# Patient Record
Sex: FEMALE | Race: ASIAN | NOT HISPANIC OR LATINO | URBAN - METROPOLITAN AREA
[De-identification: names, ages, dates, MRNs, and addresses within clinical notes are randomized per-mention and may not be internally consistent; named-entity substitution may affect disease eponyms.]

---

## 2017-03-28 ENCOUNTER — OFFICE VISIT (OUTPATIENT)
Dept: FAMILY MEDICINE | Facility: CLINIC | Age: 22
End: 2017-03-28
Payer: COMMERCIAL

## 2017-03-28 VITALS
OXYGEN SATURATION: 99 % | BODY MASS INDEX: 25.86 KG/M2 | HEIGHT: 61 IN | WEIGHT: 137 LBS | SYSTOLIC BLOOD PRESSURE: 106 MMHG | TEMPERATURE: 98.4 F | HEART RATE: 80 BPM | DIASTOLIC BLOOD PRESSURE: 72 MMHG

## 2017-03-28 DIAGNOSIS — Z11.1 SCREENING EXAMINATION FOR PULMONARY TUBERCULOSIS: Primary | ICD-10-CM

## 2017-03-28 PROCEDURE — 99213 OFFICE O/P EST LOW 20 MIN: CPT | Performed by: PHYSICIAN ASSISTANT

## 2017-03-28 PROCEDURE — 86480 TB TEST CELL IMMUN MEASURE: CPT | Performed by: PHYSICIAN ASSISTANT

## 2017-03-28 PROCEDURE — 36415 COLL VENOUS BLD VENIPUNCTURE: CPT | Performed by: PHYSICIAN ASSISTANT

## 2017-03-28 PROCEDURE — T1013 SIGN LANG/ORAL INTERPRETER: HCPCS | Mod: U3 | Performed by: PHYSICIAN ASSISTANT

## 2017-03-28 NOTE — PATIENT INSTRUCTIONS
Reassuring you have no symptoms of active TB.  Please verify diagnosis of other family members.  Will screen for TB.  If positive you will need to return for a CXR for further evaluation.    Electronically Signed By: Janki Robbins PA-C

## 2017-03-28 NOTE — MR AVS SNAPSHOT
"              After Visit Summary   3/28/2017    Bertha Klein    MRN: [id number]           Patient Information     Date Of Birth          1995        Visit Information        Provider Department      3/28/2017 10:00 AM Tonny Barnhart; Janki Robbins PA-C [facility]        Today's Diagnoses     Screening examination for pulmonary tuberculosis    -  1      Care Instructions    Reassuring you have no symptoms of active TB.  Please verify diagnosis of other family members.  Will screen for TB.  If positive you will need to return for a CXR for further evaluation.    Electronically Signed By: Janki Robbins PA-C          Follow-ups after your visit        Who to contact     If you have questions or need follow up information about today's clinic visit or your schedule please contact [facility] directly at [phone].  Normal or non-critical lab and imaging results will be communicated to you by MyChart, letter or phone within 4 business days after the clinic has received the results. If you do not hear from us within 7 days, please contact the clinic through MyChart or phone. If you have a critical or abnormal lab result, we will notify you by phone as soon as possible.  Submit refill requests through [employer] or call your pharmacy and they will forward the refill request to us. Please allow 3 business days for your refill to be completed.          Additional Information About Your Visit        MyChart Information     [employer] lets you send messages to your doctor, view your test results, renew your prescriptions, schedule appointments and more. To sign up, go to [email or web address] . Click on \"Log in\" on the left side of the screen, which will take you to the Welcome page. Then click on \"Sign up Now\" on the right side of the page.     You will be asked to enter the access code listed below, as well as some personal information. Please follow the directions to create " "your username and password.     Your access code is: [email or web address]  Expires: 2017 10:45 AM     Your access code will  in 90 days. If you need help or a new code, please call your [place] clinic or [phone].        Care EveryWhere ID     This is your Care EveryWhere ID. This could be used by other organizations to access your [place] medical records  [id number]        Your Vitals Were     Pulse Temperature Height Pulse Oximetry Breastfeeding? BMI (Body Mass Index)    80 98.4  F (36.9  C) (Oral) 5' 0.5\" (1.537 m) 99% No 26.32 kg/m2       Blood Pressure from Last 3 Encounters:   17 106/72   16 102/62   08/11/15 102/74    Weight from Last 3 Encounters:   17 137 lb (62.1 kg)   16 136 lb (61.7 kg)   16 134 lb (60.8 kg)              We Performed the Following     M Tuberculosis by Quantiferon          Today's Medication Changes          These changes are accurate as of: 3/28/17 10:45 AM.  If you have any questions, ask your nurse or doctor.               Stop taking these medicines if you haven't already. Please contact your care team if you have questions.     fluconazole 150 MG tablet   Commonly known as:  DIFLUCAN   Stopped by:  Janki Robbins PA-C                    Primary Care Provider    None Specified       No primary provider on file.        Thank you!     Thank you for choosing [facility]  for your care. Our goal is always to provide you with excellent care. Hearing back from our patients is one way we can continue to improve our services. Please take a few minutes to complete the written survey that you may receive in the mail after your visit with us. Thank you!             Your Updated Medication List - Protect others around you: Learn how to safely use, store and throw away your medicines at www.disposemymeds.org.      Notice  As of 3/28/2017 10:45 AM    You have not been prescribed any medications.      "

## 2017-03-28 NOTE — PROGRESS NOTES
SUBJECTIVE:                                                    Bertha Klein is a 21 year old female who presents to clinic today for the following health issues:    Here today with  to discuss concerns about potential exposure to TB.  Pt reports that her cousin and one of her uncles was diagnosed with TB. Upon further discussion she isn't sure if this was active or LTBI.  She doesn't live with them, but has dinner with them as a family at times.  She does not re-call that they were actively sick when she was around them. The last she was around them was 1 month ago. Believes they were diagnosed in Nov of 2016.   Her cousin had a baby and sounds like she told the patient that she wasn't contagious and this is what prompted pt to wonder if she should be checked for TB. Cousin from [facility] as is pt.   Pt denies receiving any BCG vaccine.  Pt denies any prior screening for TB.  Denies any sx of active TB including any persistent cough, fevers, night sweats, hemoptysis, abdominal pain or arthralgia.   She takes no immunosuppressive medications.  She otherwise enjoys good health.    Problem list and histories reviewed & adjusted, as indicated.  Additional history: as documented    There is no problem list on file for this patient.    Past Surgical History:   Procedure Laterality Date     APPENDECTOMY  07/2009       Social History   Substance Use Topics     Smoking status: Never Smoker     Smokeless tobacco: Never Used     Alcohol use No     History reviewed. No pertinent family history.      No current outpatient prescriptions on file.     No Known Allergies    Reviewed and updated as needed this visit by clinical staff  Tobacco  Allergies  Meds       Reviewed and updated as needed this visit by Provider         ROS:  Constitutional, HEENT, cardiovascular, pulmonary, gi and gu systems are negative, except as otherwise noted.    OBJECTIVE:                                                    /72 (BP  "Location: Right arm, Cuff Size: Adult Regular)  Pulse 80  Temp 98.4  F (36.9  C) (Oral)  Ht 5' 0.5\" (1.537 m)  Wt 137 lb (62.1 kg)  SpO2 99%  Breastfeeding? No  BMI 26.32 kg/m2  Body mass index is 26.32 kg/(m^2).  GENERAL: healthy, alert and no distress  EYES: Eyes grossly normal to inspection, PERRL and conjunctivae and sclerae normal  HENT: ear canals and TM's normal, nose and mouth without ulcers or lesions  NECK: no adenopathy, no asymmetry, masses, or scars and thyroid normal to palpation  RESP: lungs clear to auscultation - no rales, rhonchi or wheezes  CV: regular rate and rhythm, normal S1 S2, no S3 or S4, no murmur, click or rub, no peripheral edema and peripheral pulses strong    Diagnostic Test Results:  pending     ASSESSMENT/PLAN:                                                        ICD-10-CM    1. Screening examination for pulmonary tuberculosis Z11.1 M Tuberculosis by Quantiferon   Pt reports potential exposure to family members that she reports were diagnosed with TB, but she is not sure if this was LTBI versus active.  She never recalls seeing them with any active sx of TB and also reports that her cousin told her that she was not contagious which I let her know sounds more c/w LTBI.  Certainly given pt was born in [facility] and has never been screened would be a good idea to obtain this.  She declines TST and would like to obtain TB-gold testing today.  Is aware she will need to RTC for additional visit if tests +.  Pt in agreement with plan.  See Patient Instructions  She is also overdue for CPE and she was encouraged to schedule f/u for pap and preventative health.  Patient Instructions   Reassuring you have no symptoms of active TB.  Please verify diagnosis of other family members.  Will screen for TB.  If positive you will need to return for a CXR for further evaluation.    Electronically Signed By: Janki Robbins PA-C        "

## 2017-03-30 LAB
M TB TUBERC IFN-G BLD QL: NEGATIVE
M TB TUBERC IFN-G/MITOGEN IGNF BLD: 0.04 IU/ML

## 2017-03-31 ENCOUNTER — OFFICE VISIT (OUTPATIENT)
Dept: FAMILY MEDICINE | Facility: CLINIC | Age: 22
End: 2017-03-31
Payer: COMMERCIAL

## 2017-03-31 VITALS
TEMPERATURE: 98.3 F | WEIGHT: 139.3 LBS | BODY MASS INDEX: 26.3 KG/M2 | HEIGHT: 61 IN | SYSTOLIC BLOOD PRESSURE: 90 MMHG | OXYGEN SATURATION: 96 % | DIASTOLIC BLOOD PRESSURE: 66 MMHG | HEART RATE: 78 BPM

## 2017-03-31 DIAGNOSIS — E66.3 OVERWEIGHT (BMI 25.0-29.9): ICD-10-CM

## 2017-03-31 DIAGNOSIS — N89.8 VAGINAL ITCHING: ICD-10-CM

## 2017-03-31 DIAGNOSIS — Z13.6 CARDIOVASCULAR SCREENING; LDL GOAL LESS THAN 160: ICD-10-CM

## 2017-03-31 DIAGNOSIS — Z12.4 SCREENING FOR MALIGNANT NEOPLASM OF CERVIX: ICD-10-CM

## 2017-03-31 DIAGNOSIS — Z00.01 ENCOUNTER FOR ROUTINE ADULT HEALTH EXAMINATION WITH ABNORMAL FINDINGS: Primary | ICD-10-CM

## 2017-03-31 DIAGNOSIS — N91.1 SECONDARY AMENORRHEA: ICD-10-CM

## 2017-03-31 DIAGNOSIS — Z23 NEED FOR PROPHYLACTIC VACCINATION WITH TETANUS-DIPHTHERIA (TD): ICD-10-CM

## 2017-03-31 DIAGNOSIS — Z23 NEED FOR HPV VACCINE: ICD-10-CM

## 2017-03-31 LAB
BETA HCG QUAL IFA URINE: NEGATIVE
FSH SERPL-ACNC: 4.3 IU/L
HBA1C MFR BLD: 5.5 % (ref 4.3–6)
MICRO REPORT STATUS: NORMAL
PROLACTIN SERPL-MCNC: 9 UG/L (ref 3–27)
SPECIMEN SOURCE: NORMAL
WET PREP SPEC: NORMAL

## 2017-03-31 PROCEDURE — 84270 ASSAY OF SEX HORMONE GLOBUL: CPT | Performed by: PHYSICIAN ASSISTANT

## 2017-03-31 PROCEDURE — 84703 CHORIONIC GONADOTROPIN ASSAY: CPT | Performed by: PHYSICIAN ASSISTANT

## 2017-03-31 PROCEDURE — 99395 PREV VISIT EST AGE 18-39: CPT | Mod: 25 | Performed by: PHYSICIAN ASSISTANT

## 2017-03-31 PROCEDURE — 84146 ASSAY OF PROLACTIN: CPT | Performed by: PHYSICIAN ASSISTANT

## 2017-03-31 PROCEDURE — 90471 IMMUNIZATION ADMIN: CPT | Performed by: PHYSICIAN ASSISTANT

## 2017-03-31 PROCEDURE — T1013 SIGN LANG/ORAL INTERPRETER: HCPCS | Mod: U3 | Performed by: PHYSICIAN ASSISTANT

## 2017-03-31 PROCEDURE — 84443 ASSAY THYROID STIM HORMONE: CPT | Performed by: PHYSICIAN ASSISTANT

## 2017-03-31 PROCEDURE — 83001 ASSAY OF GONADOTROPIN (FSH): CPT | Performed by: PHYSICIAN ASSISTANT

## 2017-03-31 PROCEDURE — 99213 OFFICE O/P EST LOW 20 MIN: CPT | Mod: 25 | Performed by: PHYSICIAN ASSISTANT

## 2017-03-31 PROCEDURE — 83036 HEMOGLOBIN GLYCOSYLATED A1C: CPT | Performed by: PHYSICIAN ASSISTANT

## 2017-03-31 PROCEDURE — 36415 COLL VENOUS BLD VENIPUNCTURE: CPT | Performed by: PHYSICIAN ASSISTANT

## 2017-03-31 PROCEDURE — 90649 4VHPV VACCINE 3 DOSE IM: CPT | Performed by: PHYSICIAN ASSISTANT

## 2017-03-31 PROCEDURE — G0145 SCR C/V CYTO,THINLAYER,RESCR: HCPCS | Performed by: PHYSICIAN ASSISTANT

## 2017-03-31 PROCEDURE — 84403 ASSAY OF TOTAL TESTOSTERONE: CPT | Performed by: PHYSICIAN ASSISTANT

## 2017-03-31 PROCEDURE — 87210 SMEAR WET MOUNT SALINE/INK: CPT | Performed by: PHYSICIAN ASSISTANT

## 2017-03-31 NOTE — PATIENT INSTRUCTIONS
Will re-check labs for possible causes of no periods.  Given duration of issue though did also encourage pelvic US and consult with OB/GYn.  Referral placed today.  Will notify of labs when available.  Electronically Signed By: Janki Robbins PA-C      Preventive Health Recommendations  Female Ages 18 to 25     Yearly exam:     See your health care provider every year in order to  o Review health changes.   o Discuss preventive care.    o Review your medicines if your doctor has prescribed any.      You should be tested each year for STDs (sexually transmitted diseases).       After age 20, talk to your provider about how often you should have cholesterol testing.      Starting at age 21, get a Pap test every three years. If you have an abnormal result, your doctor may have you test more often.      If you are at risk for diabetes, you should have a diabetes test (fasting glucose).     Shots:     Get a flu shot each year.     Get a tetanus shot every 10 years.     Consider getting the shot (vaccine) that prevents cervical cancer (Gardasil).    Nutrition:     Eat at least 5 servings of fruits and vegetables each day.    Eat whole-grain bread, whole-wheat pasta and brown rice instead of white grains and rice.    Talk to your provider about Calcium and Vitamin D.     Lifestyle    Exercise at least 150 minutes a week each week (30 minutes a day, 5 days a week). This will help you control your weight and prevent disease.    Limit alcohol to one drink per day.    No smoking.     Wear sunscreen to prevent skin cancer.    See your dentist every six months for an exam and cleaning.

## 2017-03-31 NOTE — NURSING NOTE
"Chief Complaint   Patient presents with     Physical       Initial BP 90/66  Pulse 78  Temp 98.3  F (36.8  C) (Oral)  Ht 5' 0.5\" (1.537 m)  Wt 139 lb 4.8 oz (63.2 kg)  LMP 10/11/2016 (Approximate)  SpO2 96%  BMI 26.76 kg/m2 Estimated body mass index is 26.76 kg/(m^2) as calculated from the following:    Height as of this encounter: 5' 0.5\" (1.537 m).    Weight as of this encounter: 139 lb 4.8 oz (63.2 kg).  Medication Reconciliation: complete   Citlalli Culver Medical Assistant      "

## 2017-03-31 NOTE — LETTER
April 5, 2017      Bertha Klein  [address] [place] [id number]    Dear ,    I am happy to inform you that your recent cervical cancer screening test (PAP smear) was normal.      Preventative screenings such as this help to ensure your health for years to come. You should repeat a pap smear in 3 years, unless otherwise directed.      You will still need to return to the clinic every year for your annual exam and other preventive tests.     Please contact the clinic at [phone] if you have further questions.       Sincerely,      Janki Robbins PA-C/felicitas

## 2017-03-31 NOTE — PROGRESS NOTES
SUBJECTIVE:     CC: Bertha lKein is an 21 year old woman who presents for preventive health visit.     Healthy Habits:    Do you get at least three servings of calcium containing foods daily (dairy, green leafy vegetables, etc.)? No     Amount of exercise or daily activities, outside of work: 2-3 day(s) per week 30 minutes     Problems taking medications regularly No    Medication side effects: No    Have you had an eye exam in the past two years? yes    Do you see a dentist twice per year? yes    Do you have sleep apnea, excessive snoring or daytime drowsiness?yes snoring     Here with [place] .    No period for 6 months - LMP Oct 2016. Was worried that OCPs were causing weight gain so she discontinued it. While on OCPs does report that her periods were regular for 3 months. Then she continued to have periods until October, but since then she hasn't had any period.   Menarche age 14-15 - had regular periods once per month until age 19. Periods usually lasted for 7 days.  Reports prior to taking OCPs she had no periods.  EPIC review shows previous nl TSH, prolactin, FSH and testosterone in 2015.  Denies ever having any sexual activity.    Vaginal concern - burning. Itching x1 month. Endorses vaginal discharge, but feels this is her typical discharge and hasn't changed.    One sore area in vaginal area that bled sometimes - this occurred about 1 month. Has since resolved. Does admit that she shaves this region and believes she did scratch it.   Again denies any sexual activity.    Today's PHQ-2 Score:   PHQ-2 ( 1999 Pfizer) 3/31/2017 3/28/2017   Q1: Little interest or pleasure in doing things 0 0   Q2: Feeling down, depressed or hopeless 0 0   PHQ-2 Score 0 0       Abuse: Current or Past(Physical, Sexual or Emotional)- No  Do you feel safe in your environment - Yes    Social History   Substance Use Topics     Smoking status: Never Smoker     Smokeless tobacco: Never Used     Alcohol use No     No  "alcohol use     Recent Labs   Lab Test  02/26/16   1459   CHOL  154   HDL  38*   LDL  85   TRIG  155*   NHDL  116       Reviewed orders with patient.  Reviewed health maintenance and updated orders accordingly - Yes    Mammo Decision Support:  Mammogram not appropriate for this patient based on age.    Pertinent mammograms are reviewed under the imaging tab.  History of abnormal Pap smear: NO - age 21-29 PAP every 3 years recommended    Reviewed and updated as needed this visit by clinical staff  Tobacco  Allergies  Meds  Med Hx  Surg Hx  Fam Hx  Soc Hx        Reviewed and updated as needed this visit by Provider            ROS:  C: NEGATIVE for fever, chills, change in weight  I: NEGATIVE for worrisome rashes, moles or lesions  E: NEGATIVE for vision changes or irritation  ENT: NEGATIVE for ear, mouth and throat problems  R: NEGATIVE for significant cough or SOB  B: NEGATIVE for masses, tenderness or discharge  CV: NEGATIVE for chest pain, palpitations or peripheral edema  GI: NEGATIVE for nausea, abdominal pain, heartburn, or change in bowel habits   female: + for vaginal itching and no periods as noted above in HPI.  M: NEGATIVE for significant arthralgias or myalgia  N: NEGATIVE for weakness, dizziness or paresthesias  P: NEGATIVE for changes in mood or affect    Problem list, Medication list, Allergies, and Medical/Social/Surgical histories reviewed in EPIC and updated as appropriate.  OBJECTIVE:     BP 90/66  Pulse 78  Temp 98.3  F (36.8  C) (Oral)  Ht 5' 0.5\" (1.537 m)  Wt 139 lb 4.8 oz (63.2 kg)  LMP 10/11/2016 (Approximate)  SpO2 96%  BMI 26.76 kg/m2  EXAM:  GENERAL: healthy, alert and no distress  EYES: Eyes grossly normal to inspection, PERRL and conjunctivae and sclerae normal  HENT: ear canals and TM's normal, nose and mouth without ulcers or lesions  NECK: no adenopathy, no asymmetry, masses, or scars and thyroid normal to palpation  RESP: lungs clear to auscultation - no rales, rhonchi " or wheezes  BREAST: normal without masses, tenderness or nipple discharge and no palpable axillary masses or adenopathy  CV: regular rate and rhythm, normal S1 S2, no S3 or S4, no murmur, click or rub, no peripheral edema and peripheral pulses strong  ABDOMEN: soft, nontender, no hepatosplenomegaly, no masses and bowel sounds normal   (female): normal female external genitalia, normal urethral meatus, vaginal mucosa pink, moist, well rugated, and normal cervix/adnexa/uterus without masses. Mild clear to white discharge.   MS: no gross musculoskeletal defects noted, no edema  SKIN: no suspicious lesions or rashes  NEURO: Normal strength and tone, mentation intact and speech normal  PSYCH: mentation appears normal, affect normal/bright    Results for orders placed or performed in visit on 03/31/17   Beta HCG qual IFA urine   Result Value Ref Range    Beta HCG Qual IFA Urine Negative NEG   Wet prep   Result Value Ref Range    Specimen Description Vagina     Wet Prep       No Trichomonas seen  No clue cells seen  No yeast seen      Micro Report Status FINAL 03/31/2017          ASSESSMENT/PLAN:         ICD-10-CM    1. Encounter for routine adult health examination with abnormal findings Z00.01 Hemoglobin A1c   2. Screening for malignant neoplasm of cervix Z12.4    3. Need for HPV vaccine Z23 HUMAN PAPILLOMAVIRUS VACCINE   4. Need for prophylactic vaccination with tetanus-diphtheria (TD) Z23    5. 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 Vaginal itching L29.8 Wet prep   7. CARDIOVASCULAR SCREENING; LDL GOAL LESS THAN 160 Z13.6    8. Overweight (BMI 25.0-29.9) E66.3    Reports nl periods until age 21 then has had intermittent irregular  "period with most recently no period for 6 months. Will work-up for secondary amenorrhea and then advised consult with OB/GYN.  Pt in agreement with plan.  Tetanus hx added by pt report as stated this was received in [facility], she will see if can't bring in copy of these records for us.  Will notify of results when available.  Unclear if vaginal itching was due to shaving externally, but provided reassurance there was no lesion or sore visible or any vaginal injury I can see today. Considered GC screening, but pt adamant she has never had any sexual activity, thus, this was not completed today. Can f/u with recurrence or concerns.    COUNSELING:   Reviewed preventive health counseling, as reflected in patient instructions       Regular exercise       Healthy diet/nutrition       Vaccinated for: Human Papillomavirus       reports that she has never smoked. She has never used smokeless tobacco.    Estimated body mass index is 26.76 kg/(m^2) as calculated from the following:    Height as of this encounter: 5' 0.5\" (1.537 m).    Weight as of this encounter: 139 lb 4.8 oz (63.2 kg).   Weight management plan: encouraged regular exercise and healthy habits.    Counseling Resources:  ATP IV Guidelines  Pooled Cohorts Equation Calculator  Breast Cancer Risk Calculator  FRAX Risk Assessment  ICSI Preventive Guidelines  Dietary Guidelines for Americans, 2010  USDA's MyPlate  ASA Prophylaxis  Lung CA Screening    Janki Robbins PA-C  [facility]  "

## 2017-03-31 NOTE — MR AVS SNAPSHOT
After Visit Summary   3/31/2017    Bertha Klein    MRN: [id number]           Patient Information     Date Of Birth          1995        Visit Information        Provider Department      3/31/2017 10:00 AM Butch Zuniga; Janki Robbins PA-C [facility]        Today's Diagnoses     Encounter for routine adult health examination with abnormal findings    -  1    Screening for malignant neoplasm of cervix        Need for HPV vaccine        Need for prophylactic vaccination with tetanus-diphtheria (TD)        Secondary amenorrhea        Vaginal itching          Care Instructions    Will re-check labs for possible causes of no periods.  Given duration of issue though did also encourage pelvic US and consult with OB/GYn.  Referral placed today.  Will notify of labs when available.  Electronically Signed By: Janki Robbins PA-C      Preventive Health Recommendations  Female Ages 18 to 25     Yearly exam:     See your health care provider every year in order to  o Review health changes.   o Discuss preventive care.    o Review your medicines if your doctor has prescribed any.      You should be tested each year for STDs (sexually transmitted diseases).       After age 20, talk to your provider about how often you should have cholesterol testing.      Starting at age 21, get a Pap test every three years. If you have an abnormal result, your doctor may have you test more often.      If you are at risk for diabetes, you should have a diabetes test (fasting glucose).     Shots:     Get a flu shot each year.     Get a tetanus shot every 10 years.     Consider getting the shot (vaccine) that prevents cervical cancer (Gardasil).    Nutrition:     Eat at least 5 servings of fruits and vegetables each day.    Eat whole-grain bread, whole-wheat pasta and brown rice instead of white grains and rice.    Talk to your provider about Calcium and Vitamin D.     Lifestyle    Exercise at least  150 minutes a week each week (30 minutes a day, 5 days a week). This will help you control your weight and prevent disease.    Limit alcohol to one drink per day.    No smoking.     Wear sunscreen to prevent skin cancer.    See your dentist every six months for an exam and cleaning.        Follow-ups after your visit        Additional Services     OB/GYN REFERRAL       Your provider has referred you to:  FMG: [facility] [place] [phone]   [email or web address]    FHN: [facility] [place] [phone]   [email or web address]  FHN: [facility] [place] [phone]   [email or web address]    Please be aware that coverage of these services is subject to the terms and limitations of your health insurance plan.  Call member services at your health plan with any benefit or coverage questions.      Please bring the following with you to your appointment:    (1) Any X-Rays, CTs or MRIs which have been performed.  Contact the facility where they were done to arrange for  prior to your scheduled appointment.   (2) List of current medications   (3) This referral request   (4) Any documents/labs given to you for this referral                  Future tests that were ordered for you today     Open Future Orders        Priority Expected Expires Ordered    US Pelvic Complete w Transvaginal Routine 6/29/2017 3/31/2018 3/31/2017            Who to contact     If you have questions or need follow up information about today's clinic visit or your schedule please contact [facility] directly at [phone].  Normal or non-critical lab and imaging results will be communicated to you by MyChart, letter or phone within 4 business days after the clinic has received the results. If you do not hear from us within 7 days, please contact the clinic through MyChart or phone. If you have a critical or  "abnormal lab result, we will notify you by phone as soon as possible.  Submit refill requests through [email or web address] or call your pharmacy and they will forward the refill request to us. Please allow 3 business days for your refill to be completed.          Additional Information About Your Visit        Wevebobhart Information     [email or web address] lets you send messages to your doctor, view your test results, renew your prescriptions, schedule appointments and more. To sign up, go to [email or web address] . Click on \"Log in\" on the left side of the screen, which will take you to the Welcome page. Then click on \"Sign up Now\" on the right side of the page.     You will be asked to enter the access code listed below, as well as some personal information. Please follow the directions to create your username and password.     Your access code is: [email or web address]  Expires: 2017 10:45 AM     Your access code will  in 90 days. If you need help or a new code, please call your [facility] clinic or [phone].        Care EveryWhere ID     This is your Care EveryWhere ID. This could be used by other organizations to access your [facility] medical records  [id number]        Your Vitals Were     Pulse Temperature Height Last Period Pulse Oximetry BMI (Body Mass Index)    78 98.3  F (36.8  C) (Oral) 5' 0.5\" (1.537 m) 10/11/2016 (Approximate) 96% 26.76 kg/m2       Blood Pressure from Last 3 Encounters:   17 90/66   17 106/72   16 102/62    Weight from Last 3 Encounters:   17 139 lb 4.8 oz (63.2 kg)   17 137 lb (62.1 kg)   16 136 lb (61.7 kg)              We Performed the Following     **Testosterone Free and Total FUTURE anytime     Beta HCG qual IFA urine     Follicle stimulating hormone     Hemoglobin A1c     HUMAN PAPILLOMAVIRUS VACCINE     OB/GYN REFERRAL     Pap imaged thin layer screen only - recommended age 21 - 24 years     Prolactin     TSH with free T4 reflex     Wet prep        Primary Care " Provider Office Phone # Fax #    Janki Robbins PA-C [phone]       [facility] [address] [facility] [address]        Thank you!     Thank you for choosing [facility]  for your care. Our goal is always to provide you with excellent care. Hearing back from our patients is one way we can continue to improve our services. Please take a few minutes to complete the written survey that you may receive in the mail after your visit with us. Thank you!             Your Updated Medication List - Protect others around you: Learn how to safely use, store and throw away your medicines at www.disposemymeds.org.      Notice  As of 3/31/2017 10:58 AM    You have not been prescribed any medications.

## 2017-03-31 NOTE — LETTER
[facility]     [address]  [place], [place] [address] [phone]   April 13, 2017    Celso Klein  [address] [facility] [address]      Dear Celso,    Here is a summary of your recent test results:    TSH (thyroid stimulating hormone) level is normal which indicates normal thyroid function.   -A1C (diabetic test) is normal and indicates that your blood sugar has been in a normal range the last 3 months.   -Prolactin and FSH (follicle stimulating hormone) were normal.   -Testosterone total was normal, but free level was slightly elevated. This should be repeated at 8am in the morning to confirm result.   Normal TB screening (mantoux test).    Your test results are enclosed.      Thank you for choosing [facility].  We appreciate the opportunity to serve you and look forward to supporting your healthcare needs in the future.    If you have any questions or concerns, please call me or my staff at [phone].    Best regards,  Talya Robbins PA-C        Results for orders placed or performed in visit on 03/31/17   Pap imaged thin layer screen only - recommended age 21 - 24 years   Result Value Ref Range    PAP NIL     Copath Report         Patient Name: CELSO KLEIN  MR#: [id number]  Specimen #: [id number]  Collected: 3/31/2017  Received: 4/3/2017  Reported: 4/4/2017 15:22  Ordering Phy(s): TALYA ROBBINS    For improved result formatting, select 'View Enhanced Report Format'  under Linked Documents section.    SPECIMEN/STAIN PROCESS:  Pap imaged thin layer prep screening (Surepath, FocalPoint with guided  screening)       Pap-Cyto x 1    SOURCE: Cervical, endocervical  ----------------------------------------------------------------   Pap imaged thin layer prep screening (Surepath, FocalPoint with guided  screening)  SPECIMEN ADEQUACY:  Satisfactory for evaluation.  -Transformation zone component  present.    CYTOLOGIC INTERPRETATION:    Negative for Intraepithelial Lesion or Malignancy    Electronically signed out by:  CISCO Og (ASCP)    Processed and screened at [facility],  [facility]    CLINICAL HISTORY:  LMP: 10/11/2016  Amenorrhea: For the past 6 m onths,    Papanicolaou Test Limitations:  Cervical cytology is a screening test  with limited sensitivity; regular screening is critical for cancer  prevention; Pap tests are primarily effective for the  diagnosis/prevention of squamous cell carcinoma, not adenocarcinomas or  other cancers.    TESTING LAB LOCATION:  [facility] [address] [place]  [address] [phone]    COLLECTION SITE:  Client:  [facility]  Location: [facility] (R)     Beta HCG qual IFA urine   Result Value Ref Range    Beta HCG Qual IFA Urine Negative NEG   TSH with free T4 reflex   Result Value Ref Range    TSH 0.94 0.40 - 4.00 mU/L   Prolactin   Result Value Ref Range    Prolactin 9 3 - 27 ug/L   Follicle stimulating hormone   Result Value Ref Range    FSH 4.3 IU/L   **Testosterone Free and Total FUTURE anytime   Result Value Ref Range    Testosterone Total 28 8 - 60 ng/dL    Sex Hormone Binding Globulin 7 (L) 30 - 135 nmol/L    Free Testosterone Calculated 0.92 (H) 0.08 - 0.74 ng/dL   Hemoglobin A1c   Result Value Ref Range    Hemoglobin A1C 5.5 4.3 - 6.0 %   Wet prep   Result Value Ref Range    Specimen Description Vagina     Wet Prep       No Trichomonas seen  No clue cells seen  No yeast seen      Micro Report Status FINAL 03/31/2017

## 2017-04-01 LAB — TSH SERPL DL<=0.005 MIU/L-ACNC: 0.94 MU/L (ref 0.4–4)

## 2017-04-04 LAB
COPATH REPORT: NORMAL
PAP: NORMAL
SHBG SERPL-SCNC: 7 NMOL/L (ref 30–135)
TESTOST FREE SERPL-MCNC: 0.92 NG/DL (ref 0.08–0.74)
TESTOST SERPL-MCNC: 28 NG/DL (ref 8–60)

## 2017-04-12 NOTE — PROGRESS NOTES
Please call or write patient with the following results:     -TSH (thyroid stimulating hormone) level is normal which indicates normal thyroid function.   -A1C (diabetic test) is normal and indicates that your blood sugar has been in a normal range the last 3 months.   -Prolactin and FSH (follicle stimulating hormone) were normal.   -Testosterone total was normal, but free level was slightly elevated. This should be repeated at 8am in the morning to confirm result.       Electronically Signed By: Janki Robbins PA-C

## 2017-04-12 NOTE — PROGRESS NOTES
Please also notify the pt that her TB Screening was normal/neg.  Electronically Signed By: Janki Robbins PA-C

## 2017-06-12 ENCOUNTER — ALLIED HEALTH/NURSE VISIT (OUTPATIENT)
Dept: NURSING | Facility: CLINIC | Age: 22
End: 2017-06-12
Payer: COMMERCIAL

## 2017-06-12 DIAGNOSIS — Z23 ENCOUNTER FOR IMMUNIZATION: Primary | ICD-10-CM

## 2017-06-12 PROCEDURE — 90651 9VHPV VACCINE 2/3 DOSE IM: CPT

## 2017-06-12 PROCEDURE — 99207 ZZC NO CHARGE NURSE ONLY: CPT

## 2017-06-12 PROCEDURE — 90471 IMMUNIZATION ADMIN: CPT

## 2017-11-16 ENCOUNTER — OFFICE VISIT (OUTPATIENT)
Dept: FAMILY MEDICINE | Facility: CLINIC | Age: 22
End: 2017-11-16
Payer: COMMERCIAL

## 2017-11-16 VITALS
TEMPERATURE: 98.4 F | SYSTOLIC BLOOD PRESSURE: 104 MMHG | HEIGHT: 61 IN | BODY MASS INDEX: 26.24 KG/M2 | OXYGEN SATURATION: 100 % | WEIGHT: 139 LBS | HEART RATE: 76 BPM | DIASTOLIC BLOOD PRESSURE: 68 MMHG

## 2017-11-16 DIAGNOSIS — Z23 NEED FOR HPV VACCINATION: Primary | ICD-10-CM

## 2017-11-16 DIAGNOSIS — K21.9 GASTROESOPHAGEAL REFLUX DISEASE, ESOPHAGITIS PRESENCE NOT SPECIFIED: ICD-10-CM

## 2017-11-16 PROCEDURE — 99213 OFFICE O/P EST LOW 20 MIN: CPT | Mod: 25 | Performed by: FAMILY MEDICINE

## 2017-11-16 PROCEDURE — 90471 IMMUNIZATION ADMIN: CPT | Performed by: FAMILY MEDICINE

## 2017-11-16 PROCEDURE — 90651 9VHPV VACCINE 2/3 DOSE IM: CPT | Performed by: FAMILY MEDICINE

## 2017-11-16 NOTE — PROGRESS NOTES
"  SUBJECTIVE:                                                    Bertha Klein is a 22 year old female who presents to clinic today for the following health issues:        CHEST PAIN     Onset: 1 month     Description:   Location:  entire chest  Character: tight and heavy  Radiation: none  Duration: constant with intermittent worsening     Intensity: moderate    Progression of Symptoms:  waxing and waning    Accompanying Signs & Symptoms:  Shortness of breath: YES  Sweating: no   Nausea/vomiting: no   Lightheadedness: YES with exercise  Palpitations: no    Fever/Chills: no   Cough: no  Heartburn: YES    History:   Family history of heart disease no  Tobacco use: no    Precipitating factors:   Worse with exertion: no  Worse with deep breaths :  no  Related to food: YES    Alleviating factors:  none     Therapies Tried and outcome:  Decrease activity      \"feels like hot with her breath\" Pain will go away within 5-10 minutes. Has happened 2-3 times so far this month. No regular nausea or vomiting but has in the past -- maybe once in the past year.  Has never tried to take anything for symptoms. Feels like it is associated with food but not any food in particular. Denies any dizziness shortness of breath, or dyspnea.         Problem list and histories reviewed & adjusted, as indicated.  Additional history: as documented    Patient Active Problem List   Diagnosis     Secondary amenorrhea     CARDIOVASCULAR SCREENING; LDL GOAL LESS THAN 160     Overweight (BMI 25.0-29.9)     Past Surgical History:   Procedure Laterality Date     APPENDECTOMY  07/2009       Social History   Substance Use Topics     Smoking status: Never Smoker     Smokeless tobacco: Never Used     Alcohol use No     History reviewed. No pertinent family history.          ROS:  Constitutional, HEENT, cardiovascular, pulmonary, gi and gu systems are negative, except as otherwise noted.      OBJECTIVE:   /68 (BP Location: Right arm, Patient Position: " "Chair, Cuff Size: Adult Regular)  Pulse 76  Temp 98.4  F (36.9  C) (Oral)  Ht 5' 0.5\" (1.537 m)  Wt 139 lb (63 kg)  SpO2 100%  BMI 26.7 kg/m2  Body mass index is 26.7 kg/(m^2).  GENERAL: healthy, alert and no distress  EYES: Eyes grossly normal to inspection, PERRL and conjunctivae and sclerae normal  HENT: ear canals and TM's normal, nose and mouth without ulcers or lesions  NECK: no adenopathy and no asymmetry, masses, or scars  RESP: lungs clear to auscultation - no rales, rhonchi or wheezes  CV: regular rate and rhythm, normal S1 S2, no S3 or S4, no murmur, click or rub, no peripheral edema and peripheral pulses strong  ABDOMEN: soft, nontender, no hepatosplenomegaly, no masses and bowel sounds normal  MS: no gross musculoskeletal defects noted, no edema    Diagnostic Test Results:  none     ASSESSMENT/PLAN:   1. Gastroesophageal reflux disease, esophagitis presence not specified: symptoms consistent with heartburn. As she is only getting symptoms a couple times per month and has not tried any over the counter antacid treatments. Recommended trying Tums, Rolaids, or other to see if this helps with symptoms. If symptoms persistent or become more frequent, could consider trial of H2 blocker, PPI. Could also consider H pylori testing.    2. Need for HPV vaccination  - HUMAN PAPILLOMA VIRUS (GARDASIL 9) VACCINE  - ADMIN 1st VACCINE      Ryan Golden, DO  [facility]  "

## 2017-11-16 NOTE — NURSING NOTE
"Chief Complaint   Patient presents with     Chest Pain     Initial /68 (BP Location: Right arm, Patient Position: Chair, Cuff Size: Adult Regular)  Pulse 76  Temp 98.4  F (36.9  C) (Oral)  Ht 5' 0.5\" (1.537 m)  Wt 139 lb (63 kg)  SpO2 100%  BMI 26.7 kg/m2 Estimated body mass index is 26.7 kg/(m^2) as calculated from the following:    Height as of this encounter: 5' 0.5\" (1.537 m).    Weight as of this encounter: 139 lb (63 kg).  BP completed using cuff size regular right Arm  [facility] CMA    "

## 2017-11-16 NOTE — PATIENT INSTRUCTIONS
"  GERD (Adult)    The esophagus is a tube that carries food from the mouth to the stomach. A valve at the lower end of the esophagus prevents stomach acid from flowing upward. When this valve doesn't work properly, stomach contents may repeatedly flow back up (reflux) into the esophagus. This is called gastroesophageal reflux disease (GERD). GERD can irritate the esophagus. It can cause problems with swallowing or breathing. In severe cases, GERD can cause recurrent pneumonia or other serious problems.  Symptoms of reflux include burning, pressure or sharp pain in the upper abdomen or mid to lower chest. The pain can spread to the neck, back, or shoulder. There may be belching, an acid taste in the back of the throat, chronic cough, or sore throat or hoarseness. GERD symptoms often occur during the day after a big meal. They can also occur at night when lying down.   Home care  Lifestyle changes can help reduce symptoms. If needed, medicines may be prescribed. Symptoms often improve with treatment, but if treatment is stopped, the symptoms often return after a few months. So most persons with GERD will need to continue treatment.  Lifestyle changes    Limit or avoid fatty, fried, and spicy foods, as well as coffee, chocolate, mint, and foods with high acid content such as tomatoes and citrus fruit and juices (orange, grapefruit, lemon).    Don t eat large meals, especially at night. Frequent, smaller meals are best. Do not lie down right after eating. And don t eat anything 3 hours before going to bed.    Avoid drinking alcohol and smoking. As much as possible, stay away from second hand smoke.    If you are overweight, losing weight will reduce symptoms.     Avoid wearing tight clothing around your stomach area.    If your symptoms occur during sleep, use a foam wedge to elevate your upper body (not just your head.) Or, place 4\" blocks under the head of your bed.  Medicines  If needed, medicines can help relieve " the symptoms of GERD and prevent damage to the esophagus. Discuss a medicine plan with your healthcare provider. This may include one or more of the following medicines:    Antacids to help neutralize the normal acids in your stomach.    Acid blockers (H2 blockers) to decrease acid production.    Acid inhibitors (PPIs) to decrease acid production in a different way than the blockers. They may work better, but can take a little longer to take effect.  Take an antacid 30-60 minutes after eating and at bedtime, but not at the same time as an acid blocker.  Try not to take medicines such as ibuprofen and aspirin. If you are taking aspirin for your heart or other medical reasons, talk to your healthcare provider about stopping it.  Follow-up care  Follow up with your healthcare provider or as advised by our staff.  When to seek medical advice  Call your healthcare provider if any of the following occur:    Stomach pain gets worse or moves to the lower right abdomen (appendix area)    Chest pain appears or gets worse, or spreads to the back, neck, shoulder, or arm    Frequent vomiting (can t keep down liquids)    Blood in the stool or vomit (red or black in color)    Feeling weak or dizzy    Fever of 100.4 F (38 C) or higher, or as directed by your healthcare provider  Date Last Reviewed: 6/23/2015 2000-2017 [employer]. [address] [place] [address], [place] [address]. All rights reserved. This information is not intended as a substitute for professional medical care. Always follow your healthcare professional's instructions.            When having symptoms of heart burn or reflux, try using over-the-counter TUMS or ROLAIDS or MAALOX, etc...

## 2017-11-16 NOTE — NURSING NOTE
Screening Questionnaire for Adult Immunization    Are you sick today?   No   Do you have allergies to medications, food, a vaccine component or latex?   No   Have you ever had a serious reaction after receiving a vaccination?   No   Do you have a long-term health problem with heart disease, lung disease, asthma, kidney disease, metabolic disease (e.g. diabetes), anemia, or other blood disorder?   No   Do you have cancer, leukemia, HIV/AIDS, or any other immune system problem?   No   In the past 3 months, have you taken medications that affect  your immune system, such as prednisone, other steroids, or anticancer drugs; drugs for the treatment of rheumatoid arthritis, Crohn s disease, or psoriasis; or have you had radiation treatments?   No   Have you had a seizure, or a brain or other nervous system problem?   No   During the past year, have you received a transfusion of blood or blood     products, or been given immune (gamma) globulin or antiviral drug?   No   For women: Are you pregnant or is there a chance you could become        pregnant during the next month?   No   Have you received any vaccinations in the past 4 weeks?   No     Immunization questionnaire answers were all negative.        Injection of HPV9 given by Essie Garcia. Patient instructed to remain in clinic for 15 minutes afterwards, and to report any adverse reaction to me immediately.       Screening performed by Essie Garcia on 11/16/2017 at 8:38 AM.

## 2017-11-16 NOTE — MR AVS SNAPSHOT
After Visit Summary   11/16/2017    Bertha Klein    MRN: [id number]           Patient Information     Date Of Birth          1995        Visit Information        Provider Department      11/16/2017 8:20 AM Ryan Golden DO [facility] [place]        Today's Diagnoses     Need for HPV vaccination    -  1      Care Instructions      GERD (Adult)    The esophagus is a tube that carries food from the mouth to the stomach. A valve at the lower end of the esophagus prevents stomach acid from flowing upward. When this valve doesn't work properly, stomach contents may repeatedly flow back up (reflux) into the esophagus. This is called gastroesophageal reflux disease (GERD). GERD can irritate the esophagus. It can cause problems with swallowing or breathing. In severe cases, GERD can cause recurrent pneumonia or other serious problems.  Symptoms of reflux include burning, pressure or sharp pain in the upper abdomen or mid to lower chest. The pain can spread to the neck, back, or shoulder. There may be belching, an acid taste in the back of the throat, chronic cough, or sore throat or hoarseness. GERD symptoms often occur during the day after a big meal. They can also occur at night when lying down.   Home care  Lifestyle changes can help reduce symptoms. If needed, medicines may be prescribed. Symptoms often improve with treatment, but if treatment is stopped, the symptoms often return after a few months. So most persons with GERD will need to continue treatment.  Lifestyle changes    Limit or avoid fatty, fried, and spicy foods, as well as coffee, chocolate, mint, and foods with high acid content such as tomatoes and citrus fruit and juices (orange, grapefruit, lemon).    Don t eat large meals, especially at night. Frequent, smaller meals are best. Do not lie down right after eating. And don t eat anything 3 hours before going to bed.    Avoid drinking alcohol and smoking. As much as possible,  "stay away from second hand smoke.    If you are overweight, losing weight will reduce symptoms.     Avoid wearing tight clothing around your stomach area.    If your symptoms occur during sleep, use a foam wedge to elevate your upper body (not just your head.) Or, place 4\" blocks under the head of your bed.  Medicines  If needed, medicines can help relieve the symptoms of GERD and prevent damage to the esophagus. Discuss a medicine plan with your healthcare provider. This may include one or more of the following medicines:    Antacids to help neutralize the normal acids in your stomach.    Acid blockers (H2 blockers) to decrease acid production.    Acid inhibitors (PPIs) to decrease acid production in a different way than the blockers. They may work better, but can take a little longer to take effect.  Take an antacid 30-60 minutes after eating and at bedtime, but not at the same time as an acid blocker.  Try not to take medicines such as ibuprofen and aspirin. If you are taking aspirin for your heart or other medical reasons, talk to your healthcare provider about stopping it.  Follow-up care  Follow up with your healthcare provider or as advised by our staff.  When to seek medical advice  Call your healthcare provider if any of the following occur:    Stomach pain gets worse or moves to the lower right abdomen (appendix area)    Chest pain appears or gets worse, or spreads to the back, neck, shoulder, or arm    Frequent vomiting (can t keep down liquids)    Blood in the stool or vomit (red or black in color)    Feeling weak or dizzy    Fever of 100.4 F (38 C) or higher, or as directed by your healthcare provider  Date Last Reviewed: 6/23/2015 2000-2017 [employer]. [address] [place] [address], [place] [address]. All rights reserved. This information is not intended as a substitute for professional medical care. Always follow your healthcare professional's instructions.            When having symptoms " "of heart burn or reflux, try using over-the-counter TUMS or ROLAIDS or MAALOX, etc...          Follow-ups after your visit        Follow-up notes from your care team     Return in about 1 month (around 2017).      Who to contact     If you have questions or need follow up information about today's clinic visit or your schedule please contact [facility] [place] directly at [phone].  Normal or non-critical lab and imaging results will be communicated to you by [employer], letter or phone within 4 business days after the clinic has received the results. If you do not hear from us within 7 days, please contact the clinic through Pressyt or phone. If you have a critical or abnormal lab result, we will notify you by phone as soon as possible.  Submit refill requests through [employer] or call your pharmacy and they will forward the refill request to us. Please allow 3 business days for your refill to be completed.          Additional Information About Your Visit        [employer] Information     [employer] lets you send messages to your doctor, view your test results, renew your prescriptions, schedule appointments and more. To sign up, go to [email or web address] . Click on \"Log in\" on the left side of the screen, which will take you to the Welcome page. Then click on \"Sign up Now\" on the right side of the page.     You will be asked to enter the access code listed below, as well as some personal information. Please follow the directions to create your username and password.     Your access code is: [email or web address]  Expires: 2018  8:54 AM     Your access code will  in 90 days. If you need help or a new code, please call your [facility] or [phone].        Care EveryWhere ID     This is your Care EveryWhere ID. This could be used by other organizations to access your [place] medical records  [id number]        Your Vitals Were     Pulse Temperature Height Pulse Oximetry BMI (Body Mass Index)       " "76 98.4  F (36.9  C) (Oral) 5' 0.5\" (1.537 m) 100% 26.7 kg/m2        Blood Pressure from Last 3 Encounters:   11/16/17 104/68   03/31/17 90/66   03/28/17 106/72    Weight from Last 3 Encounters:   11/16/17 139 lb (63 kg)   03/31/17 139 lb 4.8 oz (63.2 kg)   03/28/17 137 lb (62.1 kg)              We Performed the Following     ADMIN 1st VACCINE     HUMAN PAPILLOMA VIRUS (GARDASIL 9) VACCINE        Primary Care Provider Office Phone # Fax #    Janki Robbins PA-C [phone]       [address]  [place] [address]        Equal Access to Services     [facility] : Hadii rebecca sanchez hadasho Soabbi, waaxda luqadaha, qaybta kaalmada adeegyada, sterling hernandez hayelpidio cam . So [facility] [phone].    ATENCIÓN: Si habla español, tiene a ross disposición servicios gratuitos de asistencia lingüística. Llame al [phone].    We comply with applicable federal civil rights laws and [place] laws. We do not discriminate on the basis of race, color, national origin, age, disability, sex, sexual orientation, or gender identity.            Thank you!     Thank you for choosing [facility]  for your care. Our goal is always to provide you with excellent care. Hearing back from our patients is one way we can continue to improve our services. Please take a few minutes to complete the written survey that you may receive in the mail after your visit with us. Thank you!             Your Updated Medication List - Protect others around you: Learn how to safely use, store and throw away your medicines at www.disposemymeds.org.      Notice  As of 11/16/2017  8:54 AM    You have not been prescribed any medications.      "

## 2017-11-27 ENCOUNTER — OFFICE VISIT (OUTPATIENT)
Dept: FAMILY MEDICINE | Facility: CLINIC | Age: 22
End: 2017-11-27
Payer: COMMERCIAL

## 2017-11-27 VITALS
OXYGEN SATURATION: 99 % | DIASTOLIC BLOOD PRESSURE: 68 MMHG | HEIGHT: 60 IN | WEIGHT: 139 LBS | HEART RATE: 100 BPM | SYSTOLIC BLOOD PRESSURE: 102 MMHG | BODY MASS INDEX: 27.29 KG/M2 | TEMPERATURE: 97.6 F

## 2017-11-27 DIAGNOSIS — R30.0 DYSURIA: ICD-10-CM

## 2017-11-27 DIAGNOSIS — Z11.3 SCREEN FOR STD (SEXUALLY TRANSMITTED DISEASE): ICD-10-CM

## 2017-11-27 DIAGNOSIS — N89.8 VAGINAL ITCHING: ICD-10-CM

## 2017-11-27 DIAGNOSIS — Z72.51 UNPROTECTED SEX: ICD-10-CM

## 2017-11-27 DIAGNOSIS — N89.8 VAGINAL DISCHARGE: ICD-10-CM

## 2017-11-27 DIAGNOSIS — N89.8 VAGINAL LESION: Primary | ICD-10-CM

## 2017-11-27 LAB
ALBUMIN UR-MCNC: NEGATIVE MG/DL
APPEARANCE UR: CLEAR
BACTERIA #/AREA URNS HPF: ABNORMAL /HPF
BETA HCG QUAL IFA URINE: NEGATIVE
BILIRUB UR QL STRIP: NEGATIVE
COLOR UR AUTO: YELLOW
GLUCOSE UR STRIP-MCNC: NEGATIVE MG/DL
HGB UR QL STRIP: ABNORMAL
KETONES UR STRIP-MCNC: NEGATIVE MG/DL
LEUKOCYTE ESTERASE UR QL STRIP: ABNORMAL
NITRATE UR QL: NEGATIVE
NON-SQ EPI CELLS #/AREA URNS LPF: ABNORMAL /LPF
PH UR STRIP: 6 PH (ref 5–7)
RBC #/AREA URNS AUTO: ABNORMAL /HPF
SOURCE: ABNORMAL
SP GR UR STRIP: 1.01 (ref 1–1.03)
SPECIMEN SOURCE: NORMAL
UROBILINOGEN UR STRIP-ACNC: 0.2 EU/DL (ref 0.2–1)
WBC #/AREA URNS AUTO: ABNORMAL /HPF
WET PREP SPEC: NORMAL

## 2017-11-27 PROCEDURE — 87210 SMEAR WET MOUNT SALINE/INK: CPT | Performed by: PHYSICIAN ASSISTANT

## 2017-11-27 PROCEDURE — 87086 URINE CULTURE/COLONY COUNT: CPT | Performed by: PHYSICIAN ASSISTANT

## 2017-11-27 PROCEDURE — 84703 CHORIONIC GONADOTROPIN ASSAY: CPT | Performed by: PHYSICIAN ASSISTANT

## 2017-11-27 PROCEDURE — 99214 OFFICE O/P EST MOD 30 MIN: CPT | Performed by: PHYSICIAN ASSISTANT

## 2017-11-27 PROCEDURE — 81001 URINALYSIS AUTO W/SCOPE: CPT | Performed by: PHYSICIAN ASSISTANT

## 2017-11-27 PROCEDURE — 87529 HSV DNA AMP PROBE: CPT | Performed by: PHYSICIAN ASSISTANT

## 2017-11-27 PROCEDURE — 87591 N.GONORRHOEAE DNA AMP PROB: CPT | Performed by: PHYSICIAN ASSISTANT

## 2017-11-27 PROCEDURE — 87529 HSV DNA AMP PROBE: CPT | Mod: 59 | Performed by: PHYSICIAN ASSISTANT

## 2017-11-27 PROCEDURE — 87491 CHLMYD TRACH DNA AMP PROBE: CPT | Performed by: PHYSICIAN ASSISTANT

## 2017-11-27 PROCEDURE — T1013 SIGN LANG/ORAL INTERPRETER: HCPCS | Mod: U3 | Performed by: PHYSICIAN ASSISTANT

## 2017-11-27 RX ORDER — VALACYCLOVIR HYDROCHLORIDE 1 G/1
1000 TABLET, FILM COATED ORAL 2 TIMES DAILY
Qty: 20 TABLET | Refills: 0 | Status: SHIPPED | OUTPATIENT
Start: 2017-11-27

## 2017-11-27 NOTE — MR AVS SNAPSHOT
"              After Visit Summary   11/27/2017    Bertha Klein    MRN: [id number]           Patient Information     Date Of Birth          1995        Visit Information        Provider Department      11/27/2017 6:15 PM Tonny Barnhart; Janki Robbins PA-C [facility]        Today's Diagnoses     Vaginal discharge    -  1    Dysuria        Vaginal itching        Screen for STD (sexually transmitted disease)        Unprotected sex        Vaginal lesion          Care Instructions    Unclear if this is a herpes virus infection at this time.  Sample obtained and will notify of results when available.  Given it's within 48 hours, ok to start anti-viral medication to see if this helps.  Reviewed risks of transmission, STD prevention in future and advised condom use always.  Re-check with persistent symptoms or concerns.    Electronically Signed By: Janki Robbins PA-C            Follow-ups after your visit        Who to contact     If you have questions or need follow up information about today's clinic visit or your schedule please contact [facility] [place] directly at [phone].  Normal or non-critical lab and imaging results will be communicated to you by MyChart, letter or phone within 4 business days after the clinic has received the results. If you do not hear from us within 7 days, please contact the clinic through [employer] or phone. If you have a critical or abnormal lab result, we will notify you by phone as soon as possible.  Submit refill requests through [employer] or call your pharmacy and they will forward the refill request to us. Please allow 3 business days for your refill to be completed.          Additional Information About Your Visit        [employer] Information     [employer] lets you send messages to your doctor, view your test results, renew your prescriptions, schedule appointments and more. To sign up, go to [email or web address] . Click on \"Log in\" on the left " "side of the screen, which will take you to the Welcome page. Then click on \"Sign up Now\" on the right side of the page.     You will be asked to enter the access code listed below, as well as some personal information. Please follow the directions to create your username and password.     Your access code is: [email or web address]  Expires: 2018  8:54 AM     Your access code will  in 90 days. If you need help or a new code, please call your [place] clinic or [phone].        Care EveryWhere ID     This is your Care EveryWhere ID. This could be used by other organizations to access your [place] medical records  [id number]        Your Vitals Were     Pulse Temperature Height Pulse Oximetry Breastfeeding? BMI (Body Mass Index)    100 97.6  F (36.4  C) (Oral) 5' (1.524 m) 99% No 27.15 kg/m2       Blood Pressure from Last 3 Encounters:   17 102/68   17 104/68   17 90/66    Weight from Last 3 Encounters:   17 139 lb (63 kg)   17 139 lb (63 kg)   17 139 lb 4.8 oz (63.2 kg)              We Performed the Following     *UA reflex to Microscopic and Culture ([place] and [facility] (except [place] and [place])     Beta HCG qual IFA urine     Chlamydia trachomatis PCR     HSV 1 and 2 DNA by PCR     Neisseria gonorrhoeae PCR     Urine Culture Aerobic Bacterial     Urine Microscopic     Wet prep          Today's Medication Changes          These changes are accurate as of: 17  7:28 PM.  If you have any questions, ask your nurse or doctor.               Start taking these medicines.        Dose/Directions    valACYclovir 1000 mg tablet   Commonly known as:  VALTREX   Used for:  Vaginal lesion   Started by:  Janki Robbins PA-C        Dose:  1000 mg   Take 1 tablet (1,000 mg) by mouth 2 times daily   Quantity:  20 tablet   Refills:  0            Where to get your medicines      These medications were sent to [facility] [address] [place] [address] "  [address] [facility] [address], [place] [address]     Phone:  [phone]     valACYclovir 1000 mg tablet                Primary Care Provider Office Phone # Fax #    Janki Robbins PA-C [phone] [address] [facility] [address]        Equal Access to Services     [facility]: Hadii aad ku hadasho Soomaali, waaxda luqadaha, qaybta kaalmada adeegyada, waxay idiin hayaan adeeg kharash la'aan . So [facility] [phone].    ATENCIÓN: Si habla español, tiene a ross disposición servicios gratuitos de asistencia lingüística. Thai al [phone].    We comply with applicable federal civil rights laws and [place] laws. We do not discriminate on the basis of race, color, national origin, age, disability, sex, sexual orientation, or gender identity.            Thank you!     Thank you for choosing [facility]  for your care. Our goal is always to provide you with excellent care. Hearing back from our patients is one way we can continue to improve our services. Please take a few minutes to complete the written survey that you may receive in the mail after your visit with us. Thank you!             Your Updated Medication List - Protect others around you: Learn how to safely use, store and throw away your medicines at www.disposemymeds.org.          This list is accurate as of: 11/27/17  7:28 PM.  Always use your most recent med list.                   Brand Name Dispense Instructions for use Diagnosis    valACYclovir 1000 mg tablet    VALTREX    20 tablet    Take 1 tablet (1,000 mg) by mouth 2 times daily    Vaginal lesion

## 2017-11-28 LAB
BACTERIA SPEC CULT: NORMAL
BACTERIA SPEC CULT: NORMAL
SPECIMEN SOURCE: NORMAL

## 2017-11-28 NOTE — PROGRESS NOTES
SUBJECTIVE:   Bertha Klein is a 22 year old female who presents to clinic today for the following health issues:    Here today with     URINARY TRACT SYMPTOMS; upon further discussion with  pt reports vaginal itching/discomfort. States she had difficulty describing what symptom is.  She asked the  to leave and then tells me she had her first sexual partner 2 days ago. Unfortunately, I was unable to ask further questions thus at this point pt was amenable to continuing the visit with the . She denies concern for STD as this is her first partner, but upon further review she was not his first partner.  No prior history of cold sores or genital lesions.  Hx of irregular periods and vaginal itching, but has never seen a genital sore.     Onset: started two days ago    Description:   Painful urination (Dysuria): YES  Blood in urine (Hematuria): yes  Delay in urine (Hesitency): no     Intensity: moderate    Progression of Symptoms:  worsening    Accompanying Signs & Symptoms:  Fever/chills: no   Flank pain no   Nausea and vomiting: no   Any vaginal symptoms: vaginal discharge and vaginal itching  Abdominal/Pelvic Pain: no     History:   History of frequent UTI's: no   History of kidney stones: no   Sexually Active: no   Possibility of pregnancy: No    Precipitating factors:       Therapies Tried and outcome:       Problem list and histories reviewed & adjusted, as indicated.  Additional history: as documented    Patient Active Problem List   Diagnosis     Secondary amenorrhea     CARDIOVASCULAR SCREENING; LDL GOAL LESS THAN 160     Overweight (BMI 25.0-29.9)     Past Surgical History:   Procedure Laterality Date     APPENDECTOMY  07/2009       Social History   Substance Use Topics     Smoking status: Never Smoker     Smokeless tobacco: Never Used     Alcohol use No     History reviewed. No pertinent family history.      Current Outpatient Prescriptions   Medication Sig  Dispense Refill     valACYclovir (VALTREX) 1000 mg tablet Take 1 tablet (1,000 mg) by mouth 2 times daily 20 tablet 0     No Known Allergies      Reviewed and updated as needed this visit by clinical staffTobacco  Allergies  Meds  Med Hx  Surg Hx  Fam Hx  Soc Hx      Reviewed and updated as needed this visit by Provider  Tobacco  Allergies  Meds  Med Hx  Surg Hx  Fam Hx  Soc Hx        ROS:  Constitutional, HEENT, cardiovascular, pulmonary, gi and gu systems are negative, except as otherwise noted.      OBJECTIVE:   /68  Pulse 100  Temp 97.6  F (36.4  C) (Oral)  Ht 5' (1.524 m)  Wt 139 lb (63 kg)  SpO2 99%  Breastfeeding? No  BMI 27.15 kg/m2  Body mass index is 27.15 kg/(m^2).  GENERAL: healthy, alert and no distress   (female): normal female external genitalia excluding she does have 2 small shallow ulcerated lesions along L perineum just inferior to vaginal introitus and appears to have clusters of same along bilateral inside edges of vaginal opening. This is somewhat difficult to assess due to pt degree of discomfort. Thus speculum exam was somewhat limited by this. Did manage to obtain GC genprobe and HSV sample from this site. Normal urethral meatus, normal cervix/adnexa/uterus without masses or discharge    Diagnostic Test Results:  Results for orders placed or performed in visit on 11/27/17   *UA reflex to Microscopic and Culture ([place] [facility] (except [place] and [place])   Result Value Ref Range    Color Urine Yellow     Appearance Urine Clear     Glucose Urine Negative NEG^Negative mg/dL    Bilirubin Urine Negative NEG^Negative    Ketones Urine Negative NEG^Negative mg/dL    Specific Gravity Urine 1.010 1.003 - 1.035    Blood Urine Trace (A) NEG^Negative    pH Urine 6.0 5.0 - 7.0 pH    Protein Albumin Urine Negative NEG^Negative mg/dL    Urobilinogen Urine 0.2 0.2 - 1.0 EU/dL    Nitrite Urine Negative NEG^Negative    Leukocyte Esterase Urine Small (A) NEG^Negative     Source Midstream Urine    Beta HCG qual IFA urine   Result Value Ref Range    Beta HCG Qual IFA Urine Negative NEG^Negative      Urine Microscopic   Result Value Ref Range    WBC Urine 5-10 (A) OTO2^O - 2 /HPF    RBC Urine 2-5 (A) OTO2^O - 2 /HPF    Squamous Epithelial /LPF Urine Few FEW^Few /LPF    Bacteria Urine Few (A) NEG^Negative /HPF   Wet prep   Result Value Ref Range    Specimen Description Vagina     Wet Prep No Trichomonas seen     Wet Prep No clue cells seen     Wet Prep No yeast seen    Urine Culture Aerobic Bacterial   Result Value Ref Range    Specimen Description Midstream Urine     Culture Micro <10,000 colonies/mL  mixed urogenital christina       Culture Micro Susceptibility testing not routinely done        ASSESSMENT/PLAN:       ICD-10-CM    1. Vaginal lesion N89.8 HSV 1 and 2 DNA by PCR     valACYclovir (VALTREX) 1000 mg tablet   2. Vaginal discharge N89.8 Wet prep     Urine Microscopic   3. Dysuria R30.0 *UA reflex to Microscopic and Culture ([place] [facility] (except [place] and [place])     Urine Culture Aerobic Bacterial   4. Vaginal itching L29.8    5. Screen for STD (sexually transmitted disease) Z11.3 Chlamydia trachomatis PCR     Neisseria gonorrhoeae PCR   6. Unprotected sex Z72.51 Beta HCG qual IFA urine   Extensive discussion had with pt regarding the possibility of HSV infection. Due to discomfort and that pt presented within 48 hours of sx onset may benefit from anti-virals and she is in agreement with seeing if this helps. All questions answered to best of my ability with use of the .  See Patient Instructions  A total of 30 minutes was spent with the patient today, with greater than 50% of the visit involving counseling and coordination of care regarding that noted above and in pt instructions.  Patient Instructions   Unclear if this is a herpes virus infection at this time.  Sample obtained and will notify of results when available.  Given it's within 48  hours, ok to start anti-viral medication to see if this helps.  Reviewed risks of transmission, STD prevention in future and advised condom use always.  Re-check with persistent symptoms or concerns.    Electronically Signed By: Janki Robbins PA-C

## 2017-11-28 NOTE — NURSING NOTE
Chief Complaint   Patient presents with     UTI       Initial /68  Pulse 100  Temp 97.6  F (36.4  C) (Oral)  Ht 5' (1.524 m)  Wt 139 lb (63 kg)  SpO2 99%  Breastfeeding? No  BMI 27.15 kg/m2 Estimated body mass index is 27.15 kg/(m^2) as calculated from the following:    Height as of this encounter: 5' (1.524 m).    Weight as of this encounter: 139 lb (63 kg).  Medication Reconciliation: complete

## 2017-11-28 NOTE — PATIENT INSTRUCTIONS
Unclear if this is a herpes virus infection at this time.  Sample obtained and will notify of results when available.  Given it's within 48 hours, ok to start anti-viral medication to see if this helps.  Reviewed risks of transmission, STD prevention in future and advised condom use always.  Re-check with persistent symptoms or concerns.    Electronically Signed By: Janki Robbins PA-C

## 2017-11-29 LAB
C TRACH DNA SPEC QL NAA+PROBE: NEGATIVE
HSV1 DNA SPEC QL NAA+PROBE: NEGATIVE
HSV2 DNA SPEC QL NAA+PROBE: POSITIVE
N GONORRHOEA DNA SPEC QL NAA+PROBE: NEGATIVE
SPECIMEN SOURCE: ABNORMAL
SPECIMEN SOURCE: NORMAL
SPECIMEN SOURCE: NORMAL

## 2017-12-05 NOTE — PROGRESS NOTES
Please call patient with use of  the following results:    -Results were positive for genital HSV infection which is consistent with how we treated her and what we discussed in clinic. Vaginal sores should resolved within 1-2 weeks. We can treat her episodically with anti-viral medication should she continue to have any further outbreaks in the future. If she has any on-going questions it may be best to see her back in clinic given the language barrier.   -Chlamydia and gonnohrea tests are normal.  -Urine culture is abnormal but it looks like a contaminated, non clean-catch sample.  There is no need for antibiotics at this point.  If new, worsening or persistent symptoms, then you should call or return for a recheck.    Electronically Signed By: Janki Robbins PA-C

## 2017-12-13 DIAGNOSIS — N89.8 VAGINAL LESION: ICD-10-CM

## 2017-12-13 NOTE — TELEPHONE ENCOUNTER
Requested Prescriptions   Pending Prescriptions Disp Refills     valACYclovir (VALTREX) 1000 mg tablet  Last Written Prescription Date:  11/27/2017  Last Fill Quantity: 20 tablet,  # refills: 0   Last Office Visit with G, P or [facility] prescribing provider:   11/27/2017  Future Office Visit:      20 tablet 0     Sig: Take 1 tablet (1,000 mg) by mouth 2 times daily    Antivirals for Herpes Protocol Failed    12/13/2017 10:43 AM       Failed - Normal serum creatinine on file in past 12 months    Recent Labs   Lab Test  02/26/16   1459   CR  0.42*          Passed - Patient is age 12 or older       Passed - Recent or future visit with authorizing provider's specialty    Patient had office visit in the last year or has a visit in the next 30 days with authorizing provider.  See chart review.

## 2017-12-14 NOTE — TELEPHONE ENCOUNTER
Routing refill request to provider for review/approval because:  Labs out of range:  creatinine  Labs not current:  Creatinine  Nicol Stern RN, BSN  [facility]

## 2017-12-15 RX ORDER — VALACYCLOVIR HYDROCHLORIDE 1 G/1
1000 TABLET, FILM COATED ORAL 2 TIMES DAILY
Qty: 20 TABLET | Refills: 0 | OUTPATIENT
Start: 2017-12-15

## 2017-12-15 NOTE — TELEPHONE ENCOUNTER
That was a temporary script for initial outbreak, but then she doesn't need to take recurrently unless she has recurrent issues. Would advise visit to discuss should need be on-going.  Electronically Signed By: Janki Robbins PA-C

## 2017-12-18 ENCOUNTER — OFFICE VISIT (OUTPATIENT)
Dept: FAMILY MEDICINE | Facility: CLINIC | Age: 22
End: 2017-12-18
Payer: COMMERCIAL

## 2017-12-18 VITALS
HEART RATE: 96 BPM | HEIGHT: 60 IN | OXYGEN SATURATION: 97 % | TEMPERATURE: 98.4 F | BODY MASS INDEX: 27.25 KG/M2 | SYSTOLIC BLOOD PRESSURE: 112 MMHG | WEIGHT: 138.8 LBS | DIASTOLIC BLOOD PRESSURE: 76 MMHG

## 2017-12-18 DIAGNOSIS — Z23 NEED FOR HEPATITIS B VACCINATION: ICD-10-CM

## 2017-12-18 DIAGNOSIS — B96.89 BACTERIAL VAGINOSIS: Primary | ICD-10-CM

## 2017-12-18 DIAGNOSIS — N89.8 PRURITUS OF VAGINA: ICD-10-CM

## 2017-12-18 DIAGNOSIS — N76.0 BACTERIAL VAGINOSIS: Primary | ICD-10-CM

## 2017-12-18 DIAGNOSIS — R39.89 URINARY PROBLEM: ICD-10-CM

## 2017-12-18 LAB
ALBUMIN UR-MCNC: NEGATIVE MG/DL
APPEARANCE UR: CLEAR
BACTERIA #/AREA URNS HPF: ABNORMAL /HPF
BILIRUB UR QL STRIP: NEGATIVE
COLOR UR AUTO: YELLOW
GLUCOSE UR STRIP-MCNC: NEGATIVE MG/DL
HCG UR QL: NEGATIVE
HGB UR QL STRIP: NEGATIVE
KETONES UR STRIP-MCNC: ABNORMAL MG/DL
LEUKOCYTE ESTERASE UR QL STRIP: ABNORMAL
NITRATE UR QL: NEGATIVE
NON-SQ EPI CELLS #/AREA URNS LPF: ABNORMAL /LPF
PH UR STRIP: 7 PH (ref 5–7)
RBC #/AREA URNS AUTO: ABNORMAL /HPF
SOURCE: ABNORMAL
SP GR UR STRIP: 1.02 (ref 1–1.03)
SPECIMEN SOURCE: ABNORMAL
UROBILINOGEN UR STRIP-ACNC: 0.2 EU/DL (ref 0.2–1)
WBC #/AREA URNS AUTO: ABNORMAL /HPF
WET PREP SPEC: ABNORMAL

## 2017-12-18 PROCEDURE — 90471 IMMUNIZATION ADMIN: CPT | Performed by: PHYSICIAN ASSISTANT

## 2017-12-18 PROCEDURE — 87210 SMEAR WET MOUNT SALINE/INK: CPT | Performed by: PHYSICIAN ASSISTANT

## 2017-12-18 PROCEDURE — 99213 OFFICE O/P EST LOW 20 MIN: CPT | Mod: 25 | Performed by: PHYSICIAN ASSISTANT

## 2017-12-18 PROCEDURE — 81001 URINALYSIS AUTO W/SCOPE: CPT | Performed by: PHYSICIAN ASSISTANT

## 2017-12-18 PROCEDURE — 90746 HEPB VACCINE 3 DOSE ADULT IM: CPT | Performed by: PHYSICIAN ASSISTANT

## 2017-12-18 PROCEDURE — 81025 URINE PREGNANCY TEST: CPT | Performed by: PHYSICIAN ASSISTANT

## 2017-12-18 RX ORDER — METRONIDAZOLE 7.5 MG/G
1 GEL VAGINAL AT BEDTIME
Qty: 70 G | Refills: 0 | Status: SHIPPED | OUTPATIENT
Start: 2017-12-18 | End: 2017-12-23

## 2017-12-18 NOTE — MR AVS SNAPSHOT
After Visit Summary   12/18/2017    Bertha Klein    MRN: [id number]           Patient Information     Date Of Birth          1995        Visit Information        Provider Department      12/18/2017 5:40 PM Jaquan Vasquez PA-C; [facility]        Today's Diagnoses     Bacterial vaginosis    -  1    Pruritus of vagina        Urinary problem        Need for hepatitis B vaccination          Care Instructions      Vaginal Infection: Understanding the Vaginal Environment  The vagina is a canal. It connects the uterus (womb) to the outside of the body. The vagina is home to many types of bacteria and other tiny organisms. These different bacteria most often stay balanced in number. This keeps the vagina healthy. If the balance changes, an infection may result.   A Healthy Environment  Many types of bacteria are present in a healthy vagina. They don t cause problems if their amounts stay balanced. Small amounts of yeast may also be present without causing problems. The most common type of bacteria in the vagina is lactobacillus. It helps keep the vagina at a low pH. A low pH keeps bad bacteria from taking over.  Normal Vaginal Discharge  The vagina makes fluid. It is sent out as discharge. This keeps the vagina healthy. Normal discharge can be clear, white, or yellowish. Most women find that normal discharge varies in amount and color through the month.  An Unhealthy Environment  The vaginal environment may get out of balance. This may result in a vaginal infection. There are a few reasons this can happen. The pH may have changed. The amount of one organism, such as yeast, may increase. Or an outside organism may get into the vagina and throw off the balance:    Bacterial vaginosis (BV). BV is due to an imbalance in the normal bacteria in the vagina. Lactobacillus bacteria decrease. As a result, the numbers of bad bacteria increase.    Candidiasis(yeast infection).  "Yeast is a type of fungus. A yeast infection occurs when yeast cells in the vagina increase. They then attack vaginal tissues. A type of yeast called Candida albicans is often involved.    Trichomoniasis ( trich ). Trich is a parasite. It is passed from one person to another during sex. Men with trich often don t have any symptoms. In women, it can take weeks or months before symptoms appear.    1998-2575 [employer]. [address] [place] [address], [place] [address]. All rights reserved. This information is not intended as a substitute for professional medical care. Always follow your healthcare professional's instructions.  This information has been modified by your health care provider with permission from the publisher.                Follow-ups after your visit        Who to contact     If you have questions or need follow up information about today's clinic visit or your schedule please contact [facility] directly at [phone].  Normal or non-critical lab and imaging results will be communicated to you by MyChart, letter or phone within 4 business days after the clinic has received the results. If you do not hear from us within 7 days, please contact the clinic through [employer] or phone. If you have a critical or abnormal lab result, we will notify you by phone as soon as possible.  Submit refill requests through [employer] or call your pharmacy and they will forward the refill request to us. Please allow 3 business days for your refill to be completed.          Additional Information About Your Visit        [employer] Information     [employer] lets you send messages to your doctor, view your test results, renew your prescriptions, schedule appointments and more. To sign up, go to [email or web address] . Click on \"Log in\" on the left side of the screen, which will take you to the Welcome page. Then click on \"Sign up Now\" on the right side of the page.     You will be asked to enter the access " code listed below, as well as some personal information. Please follow the directions to create your username and password.     Your access code is: [email or web address]  Expires: 2018  8:54 AM     Your access code will  in 90 days. If you need help or a new code, please call your [place] clinic or [phone].        Care EveryWhere ID     This is your Care EveryWhere ID. This could be used by other organizations to access your [place] medical records  [id number]        Your Vitals Were     Pulse Temperature Height Pulse Oximetry BMI (Body Mass Index)       96 98.4  F (36.9  C) (Oral) 5' (1.524 m) 97% 27.11 kg/m2        Blood Pressure from Last 3 Encounters:   17 112/76   17 102/68   17 104/68    Weight from Last 3 Encounters:   17 138 lb 12.8 oz (63 kg)   17 139 lb (63 kg)   17 139 lb (63 kg)              We Performed the Following     *UA reflex to Microscopic and Culture ([place] and [facility] (except [place] and [place])     HCG qualitative urine     HEPATITIS B VACCINE, ADULT, IM     Urine Microscopic     Wet prep          Today's Medication Changes          These changes are accurate as of: 17 11:59 PM.  If you have any questions, ask your nurse or doctor.               Start taking these medicines.        Dose/Directions    metroNIDAZOLE 0.75 % vaginal gel   Commonly known as:  METROGEL   Used for:  Bacterial vaginosis   Started by:  Jaquan Vasquez PA-C        Dose:  1 applicator   Place 1 applicator (5 g) vaginally At Bedtime for 5 days   Quantity:  70 g   Refills:  0            Where to get your medicines      These medications were sent to [place] [address] [place] [address] [place] [address] [address] [facility] [address] [address]     Phone:  [phone]     metroNIDAZOLE 0.75 % vaginal gel                Primary Care Provider Office Phone # Fax #    Janki Robbins PA-C [phone]  [facility] [address]        Equal Access to Services     JINAAMADO BLAIR : Hadii rebecca ku hadange Soabbi, waaxda luqadaha, qaybta kaparminderda joshdelphinebuddy, sterling hernandez renaldotrina ramirezestebanzelda cam . So [facility] [phone].    ATENCIÓN: Si habla español, tiene a ross disposición servicios gratuitos de asistencia lingüística. Llame al [phone].    We comply with applicable federal civil rights laws and [place] laws. We do not discriminate on the basis of race, color, national origin, age, disability, sex, sexual orientation, or gender identity.            Thank you!     Thank you for choosing [facility]  for your care. Our goal is always to provide you with excellent care. Hearing back from our patients is one way we can continue to improve our services. Please take a few minutes to complete the written survey that you may receive in the mail after your visit with us. Thank you!             Your Updated Medication List - Protect others around you: Learn how to safely use, store and throw away your medicines at www.disposemymeds.org.          This list is accurate as of: 12/18/17 11:59 PM.  Always use your most recent med list.                   Brand Name Dispense Instructions for use Diagnosis    metroNIDAZOLE 0.75 % vaginal gel    METROGEL    70 g    Place 1 applicator (5 g) vaginally At Bedtime for 5 days    Bacterial vaginosis       valACYclovir 1000 mg tablet    VALTREX    20 tablet    Take 1 tablet (1,000 mg) by mouth 2 times daily    Vaginal lesion

## 2017-12-18 NOTE — PROGRESS NOTES
SUBJECTIVE:   Bertha Klein is a 22 year old female who presents to clinic today for the following health issues:      URINARY TRACT SYMPTOMS  Onset: a couple days    Description:   Painful urination (Dysuria): no   Blood in urine (Hematuria): no   Delay in urine (Hesitency): sometimes    Intensity: mild    Progression of Symptoms:  No    Accompanying Signs & Symptoms:  Fever/chills: no but fatigued  Flank pain no   Nausea and vomiting: no   Any vaginal symptoms:  vaginal itching  Abdominal/Pelvic Pain: YES    History:   History of frequent UTI's: no   History of kidney stones: no   Sexually Active: YES  Possibility of pregnancy: Not sure    Precipitating factors:   nothing    Therapies Tried and outcome: nothing      Problem list and histories reviewed & adjusted, as indicated.  Additional history: as documented      ROS:  Constitutional, HEENT, cardiovascular, pulmonary, gi and gu systems are negative, except as otherwise noted.      OBJECTIVE:   /76 (BP Location: Right arm, Patient Position: Sitting, Cuff Size: Adult Regular)  Pulse 96  Temp 98.4  F (36.9  C) (Oral)  Ht 5' (1.524 m)  Wt 138 lb 12.8 oz (63 kg)  SpO2 97%  BMI 27.11 kg/m2  Body mass index is 27.11 kg/(m^2).  Total visit time is 15 Minutes with > 12 Minutes spent in care and consultation regarding BV etiol and treatment with follow up plan.      Diagnostic Test Results:  Results for orders placed or performed in visit on 12/18/17   *UA reflex to Microscopic and Culture ([place] and [facility] (except [place] and [place])   Result Value Ref Range    Color Urine Yellow     Appearance Urine Clear     Glucose Urine Negative NEG^Negative mg/dL    Bilirubin Urine Negative NEG^Negative    Ketones Urine Trace (A) NEG^Negative mg/dL    Specific Gravity Urine 1.020 1.003 - 1.035    Blood Urine Negative NEG^Negative    pH Urine 7.0 5.0 - 7.0 pH    Protein Albumin Urine Negative NEG^Negative mg/dL    Urobilinogen Urine 0.2 0.2 - 1.0 EU/dL     Nitrite Urine Negative NEG^Negative    Leukocyte Esterase Urine Small (A) NEG^Negative    Source Midstream Urine    HCG qualitative urine   Result Value Ref Range    HCG Qual Urine Negative NEG^Negative   Urine Microscopic   Result Value Ref Range    WBC Urine 2-5 (A) OTO2^O - 2 /HPF    RBC Urine O - 2 OTO2^O - 2 /HPF    Squamous Epithelial /LPF Urine Moderate (A) FEW^Few /LPF    Bacteria Urine Few (A) NEG^Negative /HPF   Wet prep   Result Value Ref Range    Specimen Description Vagina     Wet Prep No Trichomonas seen     Wet Prep No yeast seen     Wet Prep Clue cells seen (A)        ASSESSMENT/PLAN:   1. Bacterial vaginosis  - metroNIDAZOLE (METROGEL) 0.75 % vaginal gel; Place 1 applicator (5 g) vaginally At Bedtime for 5 days  Dispense: 70 g; Refill: 0    2. Pruritus of vagina  - Wet prep    3. Urinary problem  - *UA reflex to Microscopic and Culture ([place] and [facility] (except [place] and [place])  - HCG qualitative urine  - Urine Microscopic    4. Need for hepatitis B vaccination  - HEPATITIS B VACCINE, ADULT, IM    Use medication as directed.  Follow up if symptoms should persist, change or worsen.  Patient education materials dispensed and reviewed.  Patient amenable to this follow up plan.     Jaquan Vasquez PA-C  [facility]

## 2017-12-19 NOTE — PATIENT INSTRUCTIONS
Vaginal Infection: Understanding the Vaginal Environment  The vagina is a canal. It connects the uterus (womb) to the outside of the body. The vagina is home to many types of bacteria and other tiny organisms. These different bacteria most often stay balanced in number. This keeps the vagina healthy. If the balance changes, an infection may result.   A Healthy Environment  Many types of bacteria are present in a healthy vagina. They don t cause problems if their amounts stay balanced. Small amounts of yeast may also be present without causing problems. The most common type of bacteria in the vagina is lactobacillus. It helps keep the vagina at a low pH. A low pH keeps bad bacteria from taking over.  Normal Vaginal Discharge  The vagina makes fluid. It is sent out as discharge. This keeps the vagina healthy. Normal discharge can be clear, white, or yellowish. Most women find that normal discharge varies in amount and color through the month.  An Unhealthy Environment  The vaginal environment may get out of balance. This may result in a vaginal infection. There are a few reasons this can happen. The pH may have changed. The amount of one organism, such as yeast, may increase. Or an outside organism may get into the vagina and throw off the balance:    Bacterial vaginosis (BV). BV is due to an imbalance in the normal bacteria in the vagina. Lactobacillus bacteria decrease. As a result, the numbers of bad bacteria increase.    Candidiasis(yeast infection). Yeast is a type of fungus. A yeast infection occurs when yeast cells in the vagina increase. They then attack vaginal tissues. A type of yeast called Candida albicans is often involved.    Trichomoniasis ( trich ). Trich is a parasite. It is passed from one person to another during sex. Men with trich often don t have any symptoms. In women, it can take weeks or months before symptoms appear.    [id number] [employer]. [address] [place] [address]  [place] [address]. All rights reserved. This information is not intended as a substitute for professional medical care. Always follow your healthcare professional's instructions.  This information has been modified by your health care provider with permission from the publisher.

## 2017-12-19 NOTE — NURSING NOTE
Chief Complaint   Patient presents with     Urinary Problem       Initial /76 (BP Location: Right arm, Patient Position: Sitting, Cuff Size: Adult Regular)  Pulse 96  Temp 98.4  F (36.9  C) (Oral)  Ht 5' (1.524 m)  Wt 138 lb 12.8 oz (63 kg)  SpO2 97%  BMI 27.11 kg/m2 Estimated body mass index is 27.11 kg/(m^2) as calculated from the following:    Height as of this encounter: 5' (1.524 m).    Weight as of this encounter: 138 lb 12.8 oz (63 kg).  Medication Reconciliation: complete   Sarah Ochoa MA

## 2019-01-17 ENCOUNTER — OFFICE VISIT - HEALTHEAST (OUTPATIENT)
Dept: FAMILY MEDICINE | Facility: CLINIC | Age: 24
End: 2019-01-17

## 2019-01-17 DIAGNOSIS — S16.1XXA STRAIN OF NECK MUSCLE, INITIAL ENCOUNTER: ICD-10-CM

## 2019-01-17 DIAGNOSIS — K21.9 GASTROESOPHAGEAL REFLUX DISEASE WITHOUT ESOPHAGITIS: ICD-10-CM

## 2019-01-17 ASSESSMENT — MIFFLIN-ST. JEOR: SCORE: 1329.99

## 2019-01-21 ENCOUNTER — OFFICE VISIT - HEALTHEAST (OUTPATIENT)
Dept: FAMILY MEDICINE | Facility: CLINIC | Age: 24
End: 2019-01-21

## 2019-01-21 DIAGNOSIS — Z30.9 ENCOUNTER FOR CONTRACEPTIVE MANAGEMENT, UNSPECIFIED TYPE: ICD-10-CM

## 2019-01-21 DIAGNOSIS — Z00.00 ROUTINE GENERAL MEDICAL EXAMINATION AT A HEALTH CARE FACILITY: ICD-10-CM

## 2019-01-21 DIAGNOSIS — N89.8 VAGINAL LESION: ICD-10-CM

## 2019-01-21 LAB
CHOLEST SERPL-MCNC: 175 MG/DL
CLUE CELLS: NORMAL
FASTING STATUS PATIENT QL REPORTED: YES
FASTING STATUS PATIENT QL REPORTED: YES
GLUCOSE BLD-MCNC: 77 MG/DL (ref 70–99)
HCG UR QL: NEGATIVE
HDLC SERPL-MCNC: 35 MG/DL
LDLC SERPL CALC-MCNC: 92 MG/DL
SP GR UR STRIP: 1.01 (ref 1–1.03)
TRICHOMONAS, WET PREP: NORMAL
TRIGL SERPL-MCNC: 241 MG/DL
YEAST, WET PREP: NORMAL

## 2019-01-21 ASSESSMENT — MIFFLIN-ST. JEOR: SCORE: 1345.3

## 2019-01-22 ENCOUNTER — COMMUNICATION - HEALTHEAST (OUTPATIENT)
Dept: LAB | Facility: CLINIC | Age: 24
End: 2019-01-22

## 2019-01-22 LAB
HPV SOURCE: NORMAL
HUMAN PAPILLOMA VIRUS 16 DNA: NEGATIVE
HUMAN PAPILLOMA VIRUS 18 DNA: NEGATIVE
HUMAN PAPILLOMA VIRUS FINAL DIAGNOSIS: NORMAL
HUMAN PAPILLOMA VIRUS OTHER HR: NEGATIVE
SPECIMEN DESCRIPTION: NORMAL

## 2019-01-23 ENCOUNTER — AMBULATORY - HEALTHEAST (OUTPATIENT)
Dept: FAMILY MEDICINE | Facility: CLINIC | Age: 24
End: 2019-01-23

## 2019-01-23 DIAGNOSIS — A74.9 CHLAMYDIA INFECTION: ICD-10-CM

## 2019-01-23 LAB
C TRACH DNA SPEC QL PROBE+SIG AMP: POSITIVE
N GONORRHOEA DNA SPEC QL NAA+PROBE: NEGATIVE

## 2019-01-28 ENCOUNTER — OFFICE VISIT - HEALTHEAST (OUTPATIENT)
Dept: FAMILY MEDICINE | Facility: CLINIC | Age: 24
End: 2019-01-28

## 2019-01-28 DIAGNOSIS — A74.9 CHLAMYDIA INFECTION: ICD-10-CM

## 2019-01-28 DIAGNOSIS — N89.8 VAGINAL LESION: ICD-10-CM

## 2019-02-07 ENCOUNTER — OFFICE VISIT - HEALTHEAST (OUTPATIENT)
Dept: FAMILY MEDICINE | Facility: CLINIC | Age: 24
End: 2019-02-07

## 2019-02-07 DIAGNOSIS — N90.89 LABIAL LESION: ICD-10-CM

## 2019-02-07 DIAGNOSIS — Z71.84 TRAVEL ADVICE ENCOUNTER: ICD-10-CM

## 2019-02-08 LAB
HSV SPECIMEN: NORMAL
HSV1 DNA SPEC QL NAA+PROBE: NEGATIVE
HSV2 DNA SPEC QL NAA+PROBE: NEGATIVE

## 2019-02-11 ENCOUNTER — AMBULATORY - HEALTHEAST (OUTPATIENT)
Dept: NURSING | Facility: CLINIC | Age: 24
End: 2019-02-11

## 2019-02-11 ENCOUNTER — AMBULATORY - HEALTHEAST (OUTPATIENT)
Dept: FAMILY MEDICINE | Facility: CLINIC | Age: 24
End: 2019-02-11

## 2019-02-11 DIAGNOSIS — Z71.84 TRAVEL ADVICE ENCOUNTER: ICD-10-CM

## 2019-02-14 ENCOUNTER — COMMUNICATION - HEALTHEAST (OUTPATIENT)
Dept: FAMILY MEDICINE | Facility: CLINIC | Age: 24
End: 2019-02-14

## 2019-02-20 ENCOUNTER — COMMUNICATION - HEALTHEAST (OUTPATIENT)
Dept: FAMILY MEDICINE | Facility: CLINIC | Age: 24
End: 2019-02-20

## 2019-05-01 ENCOUNTER — OFFICE VISIT - HEALTHEAST (OUTPATIENT)
Dept: FAMILY MEDICINE | Facility: CLINIC | Age: 24
End: 2019-05-01

## 2019-05-01 ENCOUNTER — COMMUNICATION - HEALTHEAST (OUTPATIENT)
Dept: TELEHEALTH | Facility: CLINIC | Age: 24
End: 2019-05-01

## 2019-05-01 DIAGNOSIS — R53.83 FATIGUE, UNSPECIFIED TYPE: ICD-10-CM

## 2019-05-01 DIAGNOSIS — R10.84 ABDOMINAL PAIN, GENERALIZED: ICD-10-CM

## 2019-05-01 LAB
ALBUMIN SERPL-MCNC: 3.7 G/DL (ref 3.5–5)
ALP SERPL-CCNC: 46 U/L (ref 45–120)
ALT SERPL W P-5'-P-CCNC: 47 U/L (ref 0–45)
ANION GAP SERPL CALCULATED.3IONS-SCNC: 9 MMOL/L (ref 5–18)
AST SERPL W P-5'-P-CCNC: 25 U/L (ref 0–40)
BILIRUB SERPL-MCNC: 0.4 MG/DL (ref 0–1)
BUN SERPL-MCNC: 10 MG/DL (ref 8–22)
CALCIUM SERPL-MCNC: 9.2 MG/DL (ref 8.5–10.5)
CHLORIDE BLD-SCNC: 106 MMOL/L (ref 98–107)
CO2 SERPL-SCNC: 24 MMOL/L (ref 22–31)
CREAT SERPL-MCNC: 0.6 MG/DL (ref 0.6–1.1)
ERYTHROCYTE [DISTWIDTH] IN BLOOD BY AUTOMATED COUNT: 11.7 % (ref 11–14.5)
GFR SERPL CREATININE-BSD FRML MDRD: >60 ML/MIN/1.73M2
GLUCOSE BLD-MCNC: 88 MG/DL (ref 70–125)
HCG SERPL QL: NEGATIVE
HCT VFR BLD AUTO: 39.9 % (ref 35–47)
HGB BLD-MCNC: 13.4 G/DL (ref 12–16)
LIPASE SERPL-CCNC: 29 U/L (ref 0–52)
MCH RBC QN AUTO: 28.9 PG (ref 27–34)
MCHC RBC AUTO-ENTMCNC: 33.5 G/DL (ref 32–36)
MCV RBC AUTO: 86 FL (ref 80–100)
PLATELET # BLD AUTO: 242 THOU/UL (ref 140–440)
PMV BLD AUTO: 7.3 FL (ref 7–10)
POTASSIUM BLD-SCNC: 3.9 MMOL/L (ref 3.5–5)
PROT SERPL-MCNC: 7.1 G/DL (ref 6–8)
RBC # BLD AUTO: 4.62 MILL/UL (ref 3.8–5.4)
SODIUM SERPL-SCNC: 139 MMOL/L (ref 136–145)
TSH SERPL DL<=0.005 MIU/L-ACNC: 0.92 UIU/ML (ref 0.3–5)
WBC: 4.6 THOU/UL (ref 4–11)

## 2019-05-02 ENCOUNTER — COMMUNICATION - HEALTHEAST (OUTPATIENT)
Dept: FAMILY MEDICINE | Facility: CLINIC | Age: 24
End: 2019-05-02

## 2019-05-02 LAB
H PYLORI AG STL QL IA: NORMAL
REPORT STATUS: NORMAL
SPECIMEN DESCRIPTION: NORMAL

## 2019-08-28 ENCOUNTER — COMMUNICATION - HEALTHEAST (OUTPATIENT)
Dept: FAMILY MEDICINE | Facility: CLINIC | Age: 24
End: 2019-08-28

## 2019-08-28 DIAGNOSIS — Z23 ENCOUNTER FOR ADMINISTRATION OF VACCINE: ICD-10-CM

## 2019-08-29 ENCOUNTER — AMBULATORY - HEALTHEAST (OUTPATIENT)
Dept: NURSING | Facility: CLINIC | Age: 24
End: 2019-08-29

## 2019-09-19 ENCOUNTER — AMBULATORY - HEALTHEAST (OUTPATIENT)
Dept: NURSING | Facility: CLINIC | Age: 24
End: 2019-09-19

## 2019-09-19 DIAGNOSIS — Z23 ENCOUNTER FOR ADMINISTRATION OF VACCINE: ICD-10-CM

## 2020-01-22 ENCOUNTER — RECORDS - HEALTHEAST (OUTPATIENT)
Dept: GENERAL RADIOLOGY | Facility: CLINIC | Age: 25
End: 2020-01-22

## 2020-01-22 ENCOUNTER — OFFICE VISIT - HEALTHEAST (OUTPATIENT)
Dept: FAMILY MEDICINE | Facility: CLINIC | Age: 25
End: 2020-01-22

## 2020-01-22 ENCOUNTER — COMMUNICATION - HEALTHEAST (OUTPATIENT)
Dept: TELEHEALTH | Facility: CLINIC | Age: 25
End: 2020-01-22

## 2020-01-22 DIAGNOSIS — R05.9 COUGH: ICD-10-CM

## 2020-01-22 DIAGNOSIS — Z00.00 ROUTINE GENERAL MEDICAL EXAMINATION AT A HEALTH CARE FACILITY: ICD-10-CM

## 2020-01-22 DIAGNOSIS — K21.9 GASTROESOPHAGEAL REFLUX DISEASE, ESOPHAGITIS PRESENCE NOT SPECIFIED: ICD-10-CM

## 2020-01-22 LAB
ALBUMIN SERPL-MCNC: 4.1 G/DL (ref 3.5–5)
ALP SERPL-CCNC: 61 U/L (ref 45–120)
ALT SERPL W P-5'-P-CCNC: 41 U/L (ref 0–45)
ANION GAP SERPL CALCULATED.3IONS-SCNC: 10 MMOL/L (ref 5–18)
AST SERPL W P-5'-P-CCNC: 28 U/L (ref 0–40)
BASOPHILS # BLD AUTO: 0 THOU/UL (ref 0–0.2)
BASOPHILS NFR BLD AUTO: 1 % (ref 0–2)
BILIRUB SERPL-MCNC: 0.4 MG/DL (ref 0–1)
BUN SERPL-MCNC: 8 MG/DL (ref 8–22)
CALCIUM SERPL-MCNC: 9.2 MG/DL (ref 8.5–10.5)
CHLORIDE BLD-SCNC: 102 MMOL/L (ref 98–107)
CHOLEST SERPL-MCNC: 170 MG/DL
CO2 SERPL-SCNC: 25 MMOL/L (ref 22–31)
CREAT SERPL-MCNC: 0.59 MG/DL (ref 0.6–1.1)
DEPRECATED S PYO AG THROAT QL EIA: NORMAL
EOSINOPHIL # BLD AUTO: 0.1 THOU/UL (ref 0–0.4)
EOSINOPHIL NFR BLD AUTO: 2 % (ref 0–6)
ERYTHROCYTE [DISTWIDTH] IN BLOOD BY AUTOMATED COUNT: 11.7 % (ref 11–14.5)
FASTING STATUS PATIENT QL REPORTED: YES
FLUAV AG SPEC QL IA: NORMAL
FLUBV AG SPEC QL IA: NORMAL
GFR SERPL CREATININE-BSD FRML MDRD: >60 ML/MIN/1.73M2
GLUCOSE BLD-MCNC: 93 MG/DL (ref 70–125)
HCT VFR BLD AUTO: 39.8 % (ref 35–47)
HDLC SERPL-MCNC: 29 MG/DL
HGB BLD-MCNC: 13.6 G/DL (ref 12–16)
HIV 1+2 AB+HIV1 P24 AG SERPL QL IA: NEGATIVE
LDLC SERPL CALC-MCNC: 76 MG/DL
LDLC SERPL CALC-MCNC: ABNORMAL MG/DL
LYMPHOCYTES # BLD AUTO: 1.6 THOU/UL (ref 0.8–4.4)
LYMPHOCYTES NFR BLD AUTO: 33 % (ref 20–40)
MCH RBC QN AUTO: 30.3 PG (ref 27–34)
MCHC RBC AUTO-ENTMCNC: 34.1 G/DL (ref 32–36)
MCV RBC AUTO: 89 FL (ref 80–100)
MONOCYTES # BLD AUTO: 0.3 THOU/UL (ref 0–0.9)
MONOCYTES NFR BLD AUTO: 6 % (ref 2–10)
MONOCYTES NFR BLD AUTO: NEGATIVE %
NEUTROPHILS # BLD AUTO: 2.8 THOU/UL (ref 2–7.7)
NEUTROPHILS NFR BLD AUTO: 59 % (ref 50–70)
PLATELET # BLD AUTO: 243 THOU/UL (ref 140–440)
PMV BLD AUTO: 7.3 FL (ref 7–10)
POTASSIUM BLD-SCNC: 3.7 MMOL/L (ref 3.5–5)
PROT SERPL-MCNC: 7.9 G/DL (ref 6–8)
RBC # BLD AUTO: 4.48 MILL/UL (ref 3.8–5.4)
SODIUM SERPL-SCNC: 137 MMOL/L (ref 136–145)
TRIGL SERPL-MCNC: 442 MG/DL
TSH SERPL DL<=0.005 MIU/L-ACNC: 1.19 UIU/ML (ref 0.3–5)
WBC: 4.8 THOU/UL (ref 4–11)

## 2020-01-22 ASSESSMENT — MIFFLIN-ST. JEOR: SCORE: 1354.94

## 2020-01-23 LAB — GROUP A STREP BY PCR: NORMAL

## 2020-01-24 LAB
GAMMA INTERFERON BACKGROUND BLD IA-ACNC: 0.2 IU/ML
M TB IFN-G BLD-IMP: NEGATIVE
MITOGEN IGNF BCKGRD COR BLD-ACNC: -0.01 IU/ML
MITOGEN IGNF BCKGRD COR BLD-ACNC: 0.04 IU/ML
QTF INTERPRETATION: NORMAL
QTF MITOGEN - NIL: 6.01 IU/ML

## 2020-01-29 ENCOUNTER — COMMUNICATION - HEALTHEAST (OUTPATIENT)
Dept: FAMILY MEDICINE | Facility: CLINIC | Age: 25
End: 2020-01-29

## 2021-05-28 NOTE — PROGRESS NOTES
ASSESSMENT/PLAN:  Abdominal pain, generalized and Fatigue, unspecified type  This is a 23-year-old female who returned from [facility] for her wedding presented today for nausea and generalized abdominal pain and fatigue.  CBC was unremarkable.  The rest of the lab results are still pending.  We spoke about differential diagnoses including pregnancy, H. pylori infection and gastroesophageal reflux disease.  Further management will depend on the lab results and her clinical status.  I will communicate the results with patient.  She verbalized understanding and agreed with the plan  -     HM2(CBC w/o Differential)  -     Comprehensive Metabolic Panel  -     Lipase  -     H. pylori Antigen, Stool(HPSAG)  -     Beta-hCG, Qualitative, Serum  -     Thyroid Cascade    SUBJECTIVE:    Susanna Klein is a 23 y.o. female who came in today for several weeks of nausea and generalized abdominal pain.  Patient was in [facility] for her own wedding.  She returned back 2 weeks ago.  Since she has been back she has been experiencing nausea and generalized abdominal pain that is worse in the morning.  Symptoms get better as the day progresses.  She denies any vomiting.  She denies fever, chills, chest pain, shortness of breath, cough, dysuria, urinary frequency, urinary urgency, vaginal discharge.  The patient also denies diarrhea.  She denies exposure to sick contact.  She had her last menstrual period 10 days ago but the bleeding was unusual and very light.  She took a home pregnancy test while she was in [facility] and that was negative.  She has a history of gastroesophageal reflux disease.  She has complained of feeling very tired.  She has not had any new medications.    Review of Systems (except those mentioned above)  Constitutional: Negative.   HENT: Negative.   Eyes: Negative.   Respiratory: Negative.   Cardiovascular: Negative.   Gastrointestinal: Negative.   Endocrine: Negative.   Genitourinary: Negative.   Musculoskeletal:  Negative.   Skin: Negative.   Allergic/Immunologic: Negative.   Neurological: Negative.   Hematological: Negative.   Psychiatric/Behavioral: Negative.     There are no active problems to display for this patient.    No Known Allergies  Current Outpatient Medications   Medication Sig Dispense Refill     atovaquone-proguanil (MALARONE) 250-100 mg Tab One tablet daily.  Start 2 days prior to traveling and finish one week after travel 40 tablet 0     azithromycin (ZITHROMAX) 500 MG tablet Take one tablet daily for 1-3 days for traveler's diarrhea 9 tablet 0     loperamide (IMODIUM A-D) 2 mg tablet 4 mg after first loose stool, followed by 2 mg after each subsequent stool (maximum dose: 8 mg/day) 30 tablet 0     norgestimate-ethinyl estradiol (ORTHO TRI-CYCLEN LO, 28,) 0.18/0.215/0.25 mg-25 mcg Tab tablet Take 1 tablet by mouth daily. 84 tablet 3     promethazine (PHENERGAN) 12.5 MG tablet Take 1-2 tablets twice daily as needed for nausea 30 tablet 0     No current facility-administered medications for this visit.      No past medical history on file.  Past Surgical History:   Procedure Laterality Date     APPENDECTOMY       Social History     Socioeconomic History     Marital status:      Spouse name: None     Number of children: None     Years of education: None     Highest education level: None   Occupational History     None   Social Needs     Financial resource strain: None     Food insecurity:     Worry: None     Inability: None     Transportation needs:     Medical: None     Non-medical: None   Tobacco Use     Smoking status: Never Smoker     Smokeless tobacco: Never Used   Substance and Sexual Activity     Alcohol use: No     Frequency: Never     Drug use: No     Sexual activity: Yes     Partners: Male   Lifestyle     Physical activity:     Days per week: None     Minutes per session: None     Stress: None   Relationships     Social connections:     Talks on phone: None     Gets together: None     Attends  Voodoo service: None     Active member of club or organization: None     Attends meetings of clubs or organizations: None     Relationship status: None     Intimate partner violence:     Fear of current or ex partner: None     Emotionally abused: None     Physically abused: None     Forced sexual activity: None   Other Topics Concern     None   Social History Narrative     None     Family History   Problem Relation Age of Onset     Appendicitis Sister      Appendicitis Brother          OBJECTIVE:    Vitals:    05/01/19 0934   BP: 100/66   Patient Site: Left Arm   Patient Position: Sitting   Cuff Size: Adult Regular   Pulse: 62   Temp: 98.1  F (36.7  C)   TempSrc: Oral   Weight: 151 lb 1 oz (68.5 kg)     Body mass index is 29.02 kg/m .    Physical Exam:  Constitutional: Patient was oriented to person, place, and time. Patient appeared well-developed and well-nourished. No distress.   Head: Normocephalic and atraumatic.   Right Ear: External ear normal. Normal TM  Left Ear: External ear normal. Normal TM  Nose: Nose normal.   Mouth/Throat: Oropharynx was clear and moist. No oropharyngeal exudate.   Eyes: Conjunctivae and EOM were normal. Pupils were equal, round, and reactive to light. Right eye exhibited no discharge. Left eye exhibited no discharge. No scleral icterus.   Neck: Neck supple. No JVD present. No tracheal deviation present. No thyromegaly present.   Lymphadenopathy:  Patient has no cervical adenopathy.   Cardiovascular: Normal rate, regular rhythm, normal heart sounds and intact distal pulses. No murmur heard.   Pulmonary/Chest: Effort normal and breath sounds normal. No stridor. No respiratory distress. Patient had no wheezes, no rales, exhibits no tenderness.   Abdominal: Soft. Bowel sounds were normal. Patient exhibited no distension and no mass. There was no tenderness. There was no rebound and no guarding.   Skin: Skin was warm and dry. No rash noted. Patient was not diaphoretic. No erythema. No  pallor.       Results for orders placed or performed in visit on 05/01/19   HM2(CBC w/o Differential)   Result Value Ref Range    WBC 4.6 4.0 - 11.0 thou/uL    RBC 4.62 3.80 - 5.40 mill/uL    Hemoglobin 13.4 12.0 - 16.0 g/dL    Hematocrit 39.9 35.0 - 47.0 %    MCV 86 80 - 100 fL    MCH 28.9 27.0 - 34.0 pg    MCHC 33.5 32.0 - 36.0 g/dL    RDW 11.7 11.0 - 14.5 %    Platelets 242 140 - 440 thou/uL    MPV 7.3 7.0 - 10.0 fL

## 2021-05-31 NOTE — TELEPHONE ENCOUNTER
Pt is on the [facility] injection schedule tomorrow for 3rd dose of Hep B.   Please place future orders.     NICOLE Benton

## 2021-06-02 VITALS — BODY MASS INDEX: 28.56 KG/M2 | WEIGHT: 148.7 LBS

## 2021-06-02 VITALS — WEIGHT: 149 LBS | BODY MASS INDEX: 28.13 KG/M2 | HEIGHT: 61 IN

## 2021-06-02 VITALS — WEIGHT: 146.5 LBS | HEIGHT: 60 IN | BODY MASS INDEX: 28.76 KG/M2

## 2021-06-02 VITALS — WEIGHT: 149 LBS | BODY MASS INDEX: 28.62 KG/M2

## 2021-06-03 VITALS — WEIGHT: 151.06 LBS | BODY MASS INDEX: 29.02 KG/M2

## 2021-06-04 VITALS
DIASTOLIC BLOOD PRESSURE: 66 MMHG | HEART RATE: 74 BPM | WEIGHT: 152 LBS | TEMPERATURE: 97.4 F | HEIGHT: 60 IN | SYSTOLIC BLOOD PRESSURE: 100 MMHG | OXYGEN SATURATION: 97 % | BODY MASS INDEX: 29.84 KG/M2

## 2021-06-19 NOTE — LETTER
Letter by Catrachita Christy MD at      Author: Catrachita Christy MD Service: -- Author Type: --    Filed:  Encounter Date: 5/2/2019 Status: (Other)         Susanna Klein  [address] [facility] [address]             May 2, 2019         Dear Ms. Klein,    Below are the results from your recent visit:    Resulted Orders   HM2(CBC w/o Differential)   Result Value Ref Range    WBC 4.6 4.0 - 11.0 thou/uL    RBC 4.62 3.80 - 5.40 mill/uL    Hemoglobin 13.4 12.0 - 16.0 g/dL    Hematocrit 39.9 35.0 - 47.0 %    MCV 86 80 - 100 fL    MCH 28.9 27.0 - 34.0 pg    MCHC 33.5 32.0 - 36.0 g/dL    RDW 11.7 11.0 - 14.5 %    Platelets 242 140 - 440 thou/uL    MPV 7.3 7.0 - 10.0 fL   Comprehensive Metabolic Panel   Result Value Ref Range    Sodium 139 136 - 145 mmol/L    Potassium 3.9 3.5 - 5.0 mmol/L    Chloride 106 98 - 107 mmol/L    CO2 24 22 - 31 mmol/L    Anion Gap, Calculation 9 5 - 18 mmol/L    Glucose 88 70 - 125 mg/dL    BUN 10 8 - 22 mg/dL    Creatinine 0.60 0.60 - 1.10 mg/dL    GFR MDRD Af Amer >60 >60 mL/min/1.73m2    GFR MDRD Non Af Amer >60 >60 mL/min/1.73m2    Bilirubin, Total 0.4 0.0 - 1.0 mg/dL    Calcium 9.2 8.5 - 10.5 mg/dL    Protein, Total 7.1 6.0 - 8.0 g/dL    Albumin 3.7 3.5 - 5.0 g/dL    Alkaline Phosphatase 46 45 - 120 U/L    AST 25 0 - 40 U/L    ALT 47 (H) 0 - 45 U/L   Lipase   Result Value Ref Range    Lipase 29 0 - 52 U/L   H. pylori Antigen, Stool(HPSAG)   Result Value Ref Range    Specimen Description Feces       Comment:      H pylori Antigen         Negative for Helicobacter pylori antigen                            by enzyme immunoassay. A negative                           result indicates the absence of H.                            pylori antigen or that the level of antigen                           is below the level of detection.   Beta-hCG, Qualitative, Serum   Result Value Ref Range    Beta hCG Qualitative Negative Negative   Thyroid Cascade   Result Value Ref Range     TSH 0.92 0.30 - 5.00 uIU/mL     I called but could not leave a message.  All your results are normal.  I hope you are feeling better.    Please call with questions or contact us using [employer].    Sincerely,        Electronically signed by Catrachita Gauthier MD

## 2021-06-20 NOTE — LETTER
Letter by Catrachita Christy MD at      Author: Catrachita Christy MD Service: -- Author Type: --    Filed:  Encounter Date: 1/29/2020 Status: (Other)         Susanna Klein  [address]  [facility] [id number]       January 29, 2020     Dear Ms. Klein,    Below are the results from your recent visit:    Resulted Orders   Lipid Cascade   Result Value Ref Range    Cholesterol 170 <=199 mg/dL    Triglycerides 442 (H) <=149 mg/dL    HDL Cholesterol 29 (L) >=50 mg/dL   Comprehensive Metabolic Panel   Result Value Ref Range    Sodium 137 136 - 145 mmol/L    Potassium 3.7 3.5 - 5.0 mmol/L    Chloride 102 98 - 107 mmol/L    CO2 25 22 - 31 mmol/L    Anion Gap, Calculation 10 5 - 18 mmol/L    Glucose 93 70 - 125 mg/dL    BUN 8 8 - 22 mg/dL    Creatinine 0.59 (L) 0.60 - 1.10 mg/dL    GFR MDRD Af Amer >60 >60 mL/min/1.73m2    GFR MDRD Non Af Amer >60 >60 mL/min/1.73m2    Bilirubin, Total 0.4 0.0 - 1.0 mg/dL    Calcium 9.2 8.5 - 10.5 mg/dL    Protein, Total 7.9 6.0 - 8.0 g/dL    Albumin 4.1 3.5 - 5.0 g/dL    Alkaline Phosphatase 61 45 - 120 U/L    AST 28 0 - 40 U/L    ALT 41 0 - 45 U/L   HIV Antigen/Antibody Screening Cascade   Result Value Ref Range    HIV Antigen / Antibody Negative Negative   Mononucleosis Screen   Result Value Ref Range    Mono Screen Negative Negative   Rapid Strep A Screen-Throat   Result Value Ref Range    Rapid Strep A Antigen No Group A Strep detected, presumptive negative No Group A Strep detected, presumptive negative   Influenza A/B Rapid Test   Result Value Ref Range    Influenza  A, Rapid Antigen No Influenza A antigen detected No Influenza A antigen detected    Influenza B, Rapid Antigen No Influenza B antigen detected No Influenza B antigen detected   QTF-Mycobacterium tuberculosis by QuantiFERON-TB Gold Plus   Result Value Ref Range    QTF RESULT Negative Negative   Thyroid Cascade   Result Value Ref Range    TSH 1.19 0.30 - 5.00 uIU/mL   HM1 (CBC with Diff)   Result Value Ref  Range    WBC 4.8 4.0 - 11.0 thou/uL    RBC 4.48 3.80 - 5.40 mill/uL    Hemoglobin 13.6 12.0 - 16.0 g/dL    Hematocrit 39.8 35.0 - 47.0 %    MCV 89 80 - 100 fL    MCH 30.3 27.0 - 34.0 pg    MCHC 34.1 32.0 - 36.0 g/dL    RDW 11.7 11.0 - 14.5 %    Platelets 243 140 - 440 thou/uL    MPV 7.3 7.0 - 10.0 fL    Neutrophils % 59 50 - 70 %    Lymphocytes % 33 20 - 40 %    Monocytes % 6 2 - 10 %    Eosinophils % 2 0 - 6 %    Basophils % 1 0 - 2 %    Neutrophils Absolute 2.8 2.0 - 7.7 thou/uL    Lymphocytes Absolute 1.6 0.8 - 4.4 thou/uL    Monocytes Absolute 0.3 0.0 - 0.9 thou/uL    Eosinophils Absolute 0.1 0.0 - 0.4 thou/uL    Basophils Absolute 0.0 0.0 - 0.2 thou/uL   Group A Strep, RNA Direct Detection, Throat   Result Value Ref Range    Group A Strep by PCR No Group A Strep rRNA detected No Group A Strep rRNA detected   Custom LDL Cholesterol, Direct   Result Value Ref Range    Direct LDL 76 <=129 mg/dl       Your cholesterol numbers are high.  Please watch out for food high in cholesterol and carbohydrates.  I hope you are feeling better.    Please call with questions or contact us using [employer].    Sincerely,        Electronically signed by Catrachita Gauthier MD

## 2021-06-23 NOTE — PROGRESS NOTES
1. Strain of neck muscle, initial encounter     2. Gastroesophageal reflux disease without esophagitis        Medications Ordered   Medications     ibuprofen (ADVIL) 200 MG tablet     Sig: Take 2-3 tabs up to three times a day prn     Refill:  0     ranitidine (ZANTAC) 150 MG tablet     Sig: Take 1 tablet (150 mg total) by mouth 2 (two) times a day.     Dispense:  60 tablet     Refill:  5      Plan: I suggested that she think about her posture at work, and take several small breaks even 15-second breaks and do neck stretches through the day including flexion and extension and lateral flexion and rotation.  I showed her how to do this quickly, and then she could also think about doing some self massage at lunch break time.  I also suggested doing a thorough stretching before she goes to work and some heat at night along with the ibuprofen that I instructed her how to take.  She could also think about asking her work about ergonomic evaluations but she just started and does not want to rock the boat too much.  We can see how she does with this program over the next couple of months and she will let me know if she is not improving.    She is describing what sounds like some pretty straightforward GERD so we will try some ranitidine 150 mg up to twice a day and see how she does with that as well.    Subjective: 23-year-old female is here today with her  as a new patient.  She has several acute issues that she like to bring up as well as to have more of a health maintenance visit which we will put off until further time where she can have enough time to do that.    She describes some neck pain that she has mostly in the posterior neck right in the middle over the spinous processes area.  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  She is been at this job for about a month and is gradually gotten worse.  She is not used any ibuprofen or Tylenol,  she is not use any ice or heat and she does not know of any stretches that would be good for her.  She has no pain down her arms and no numbness or tingling down into her arms.  She gets a little bit into her hands but she attributes that to some carpal tunnel which a lot of her work partners have.    She also is describing some epigastric discomfort with some pain in the epigastrium going up occasionally like heartburn up into the chest.  Seems to happen after she eats or if she is very stressed.  She has had this for actually many years and it has not gotten any worse and she has not tried any medication for.    Patient is new patient to the clinic. Please see past medical history, surgical history, social history and family history, all of which were completed in their entirety today.     Objective: Well-appearing female in no acute distress.  Vital signs as noted.  The neck shows pretty good range of motion to flexion and extension she is a little limited to lateral flexion but rotation seems to be fine.  She has pain to palpation near the spinous processes in the central part of the neck as well as up into the occipital area of the head.

## 2021-06-23 NOTE — PROGRESS NOTES
ASSESSMENT/PLAN:  Chlamydia infection  Infection diagnosed during screening  Treated without problems  Discussed that her  will need treatment  Recheck in 3 months    Vaginal lesion  Specimen collection was obtained but sent in the wrong tube.  I advised that she comes back when she has the lesions to do another test to confirm.  Suspect HSV    SUBJECTIVE:    Susanna Klein is a 23 y.o. female who came in today to follow-up for lab results. She was originally seen for genital sores. STD's and how they are transferred are discussed. Patient has only had sex with her  but he has had partners before her. Her  also has genital sores (possible herpes). She tested positive for Chlamydia and took the medication she was prescribed to eradicate it. Her  is being seen at this clinic later today for STD testing.     Review of Systems (except those mentioned above)  Constitutional: Negative.   HENT: Negative.   Eyes: Negative.   Respiratory: Negative.   Cardiovascular: Negative.   Gastrointestinal: Negative.   Endocrine: Negative.   Genitourinary: Negative.   Musculoskeletal: Negative.   Skin: Negative.   Allergic/Immunologic: Negative.   Neurological: Negative.   Hematological: Negative.   Psychiatric/Behavioral: Negative.     There are no active problems to display for this patient.    No Known Allergies  Current Outpatient Medications   Medication Sig Dispense Refill     norgestimate-ethinyl estradiol (ORTHO TRI-CYCLEN LO, 28,) 0.18/0.215/0.25 mg-25 mcg Tab tablet Take 1 tablet by mouth daily. 84 tablet 3     No current facility-administered medications for this visit.      No past medical history on file.  Past Surgical History:   Procedure Laterality Date     APPENDECTOMY       Social History     Socioeconomic History     Marital status:      Spouse name: None     Number of children: None     Years of education: None     Highest education level: None   Social Needs     Financial resource  strain: None     Food insecurity - worry: None     Food insecurity - inability: None     Transportation needs - medical: None     Transportation needs - non-medical: None   Occupational History     None   Tobacco Use     Smoking status: Never Smoker     Smokeless tobacco: Never Used   Substance and Sexual Activity     Alcohol use: No     Frequency: Never     Drug use: No     Sexual activity: Yes     Partners: Male   Other Topics Concern     None   Social History Narrative     None     Family History   Problem Relation Age of Onset     Appendicitis Sister      Appendicitis Brother      OBJECTIVE:    Vitals:    01/28/19 1104   BP: 102/78   Patient Site: Left Arm   Patient Position: Sitting   Cuff Size: Adult Regular   Pulse: 64   Weight: 149 lb (67.6 kg)     Body mass index is 28.62 kg/m .    Physical Exam:  Constitutional: Patient is oriented to person, place, and time. Patient appears well-developed and well-nourished. No distress.   Head: Normocephalic and atraumatic.   Right Ear: External ear normal.   Left Ear: External ear normal.   Nose: Nose normal.   Mouth/Throat: Oropharynx is clear and moist. No oropharyngeal exudate.   Eyes: Conjunctivae and EOM are normal. Right eye exhibits no discharge. Left eye exhibits no discharge. No scleral icterus.   Neurological: Patient is alert and oriented to person, place, and time. Patient has normal reflexes. No cranial nerve deficit. Coordination normal.   Skin: No rash noted. Patient is not diaphoretic. No erythema. No pallor.    Results for orders placed or performed in visit on 01/21/19   Wet Prep, Vaginal   Result Value Ref Range    Yeast Result No yeast seen No yeast seen    Trichomonas No Trichomonas seen No Trichomonas seen    Clue Cells, Wet Prep No Clue cells seen No Clue cells seen   Chlamydia trachomatis & Neisseria gonorrhoeae, Amplified Detection   Result Value Ref Range    Chlamydia trachomatis, Amplified Detection Positive (!) Negative    Neisseria  gonorrhoeae, Amplified Detection Negative Negative   Lipid Cascade   Result Value Ref Range    Cholesterol 175 <=199 mg/dL    Triglycerides 241 (H) <=149 mg/dL    HDL Cholesterol 35 (L) >=50 mg/dL    LDL Calculated 92 <=129 mg/dL    Patient Fasting > 8hrs? Yes    Glucose   Result Value Ref Range    Glucose 77 70 - 99 mg/dL    Patient Fasting > 8hrs? Yes    Pregnancy (Beta-hCG, Qual), Urine   Result Value Ref Range    Pregnancy Test, Urine Negative Negative    Specific Gravity, UA 1.015 1.001 - 1.030   Gynecologic Cytology (PAP Smear)   Result Value Ref Range    Case Report       Gynecologic Cytology Report                       Case: [id number]                                   Authorizing Provider:  Catrachita Christy       Collected:           01/21/2019 0953                                     MD Lyly                                                                  Ordering Location:     [facility]   Received:            01/21/2019 0953                                     Family Medicine/OB                                                           First Screen:          Becky Jeronimo CT                                                                               (ASCP)                                                                       Specimen:    SUREPATH PAP, SCREENING, Endocervical/cervical                                             Interpretation  Negative for squamous intraepithelial lesion or malignancy.      Negative for squamous intraepithelial lesion or malignancy    Result Flag Normal Normal    Specimen Adequacy       Satisfactory for evaluation, endocervical/transformation zone component present    HPV Reflex? Yes regardless of result     HIGH RISK No     LMP/Menopause Date 2 months ago, irregular     Abnormal Bleeding No     Pt Status na     Birth Control/Hormones None     Previous Normal/Date first pap today     Prev Abn Date/Dx n/a     Cervical Appearance normal    HPV  High Risk DNA Cervical   Result Value Ref Range    HPV Source SurePath     HPV16 DNA Negative NEG    HPV18 DNA Negative NEG    Other HR HPV Negative NEG    Final Diagnosis SEE NOTES     Specimen Description Cervical Cells      ADDITIONAL HISTORY SUMMARIZED (2): None.   DECISION TO OBTAIN EXTRA INFORMATION (1): None.   RADIOLOGY TESTS (1): None.  LABS (1): None.  MEDICINE TESTS (1): None.  INDEPENDENT REVIEW (2 each): Labs reviewed from 1/21/2019.    Total data points = 1    I spent a total time of 12 minutes additional to the preventive, greater than 50% counseling and coordinating care regarding STD.    By signing my name below, I, Yasmine Redman, attest that this documentation has been prepared under the direction and in the presence of Dr. Lyly Shields.  Electronic Signature: Renetta Siddiqui. 01/28/2019 11:13.    I, Dr. Catrachita Gauthier MD , personally performed the services described in this documentation. All medical record entries made by the scribe were at my direction and in my presence. I have reviewed the chart and discharge instructions (if applicable) and agree that the record reflects my personal performance and is accurate and complete.

## 2021-06-23 NOTE — PATIENT INSTRUCTIONS - HE
1. Make an appointment for Travel Consult in early February, 3 weeks before you leave for [facility]    2. Come back in 1 year to discuss birth control pills   3. Call the clinic that you had your shots done at and request your vaccination records

## 2021-06-23 NOTE — PROGRESS NOTES
Assessment and plan:  1.  Travel advice encounter  Will come back for nurse only appointment for vaccines  -     Hepatitis B Vaccine Age 20 years and above  -     Hepatitis A adult 2 dose IM (19 yr & older)  -     TYPHOID, INACTIVE    Immunizations reviewed.    Discussed risks, benefits, and side effects of immunizations    Discussed travel safety and prevention:  malaria chemoprophylaxis.  We discussed food and waterborne precautions.  Personal safety, sunscreen, insect precautions, traveler's diarrhea prevention & treatment.  [facility] travel information provided    2.  Labial lesion  -     HSV 1 and 2 Qualitative DNA by PCR([facility])    3.  URI  Supportive cares     Subjective:  Susanna Klein is a 23 y.o. female who is here for a travel consult. Patient has not had any complications or difficulty with traveling in the past. She does not know if her vaccinations are UTD.     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 She is currently suffering from an outbreak and was told to come in so it can be swabbed and diagnosed. This will be done today. Her  says he was checked at the doctor but says he was told they didn't find anything and that he didn't do a UA. He is the only person she has ever had sexual intercourse with.     Review of Systems   Constitutional: Negative.   HENT: Negative.   Eyes: Negative.   Respiratory: Negative.   Cardiovascular: Negative.   Gastrointestinal: Negative.   Endocrine: Negative.   Genitourinary: Negative.   Musculoskeletal: Negative.   Skin: Negative.   Allergic/Immunologic: Negative.   Neurological: Negative.   Hematological: Negative.    Psychiatric/Behavioral: Negative.     No Known Allergies  Current Outpatient Medications   Medication Sig Dispense Refill     norgestimate-ethinyl estradiol (ORTHO TRI-CYCLEN LO, 28,) 0.18/0.215/0.25 mg-25 mcg Tab tablet Take 1 tablet by mouth daily. 84 tablet 3     No current facility-administered medications for this visit.      No past medical history on file.  Past Surgical History:   Procedure Laterality Date     APPENDECTOMY       Social History     Socioeconomic History     Marital status:      Spouse name: None     Number of children: None     Years of education: None     Highest education level: None   Social Needs     Financial resource strain: None     Food insecurity - worry: None     Food insecurity - inability: None     Transportation needs - medical: None     Transportation needs - non-medical: None   Occupational History     None   Tobacco Use     Smoking status: Never Smoker     Smokeless tobacco: Never Used   Substance and Sexual Activity     Alcohol use: No     Frequency: Never     Drug use: No     Sexual activity: Yes     Partners: Male   Other Topics Concern     None   Social History Narrative     None     Family History   Problem Relation Age of Onset     Appendicitis Sister      Appendicitis Brother      Objective:    Vitals:    02/07/19 1413   BP: 110/62   Pulse: 77   Resp: 22   Temp: 98.5  F (36.9  C)     general:  alert, oriented x 3, pleasant, NAD  SHEENT:  no skin rash/lesions, NC/AT, EOMI, OWEN, no nasal discharge, no CLAD  Mouth:  moist mucosal membrane, no oral lesion  Resp:  ctab  CVS:  rrr, no murmur  Abd:  soft, NT/ND, NABS, no CVA tenderness  Ext:  cap refill < 3 sec on all nailbeds, no bipedal edema  Pelvic exam: normal external genitalia, vulva, vagina, cervix, uterus and adnexa. Linear fissures along the posterior fornix of the introitus .     I spent a total of 25 minutes visit with over 50% of the time spent on counseling the patient about safety with traveling, food  and waterborne precautions, sunscreen, insect precautions, traveler's diarrhea prevention & treatment.    ADDITIONAL HISTORY SUMMARIZED (2): None.   DECISION TO OBTAIN EXTRA INFORMATION (1): None.   RADIOLOGY TESTS (1): None.  LABS (1): Labs ordered today.   MEDICINE TESTS (1): None.  INDEPENDENT REVIEW (2 each): None.     Total data points = 1    By signing my name below, I, Yasmine Redman, attest that this documentation has been prepared under the direction and in the presence of Dr. Lyly Shields.  Electronic Signature: Renetta Siddiqui. 02/07/2019 14:57.    I, Dr. Catrachita Gauthier MD , personally performed the services described in this documentation. All medical record entries made by the scribe were at my direction and in my presence. I have reviewed the chart and discharge instructions (if applicable) and agree that the record reflects my personal performance and is accurate and complete.

## 2021-06-24 NOTE — TELEPHONE ENCOUNTER
Spoke to pt  Scopolamine patch is on back order  I will give her malarone and RX for traveler's diarrhea

## 2021-06-24 NOTE — TELEPHONE ENCOUNTER
Medication Request  Medication name: medication for traveling to [facility]: one for diarrhea and one for malaria. Also patient requesting a patch for nausea as she did not like the pill form.  Pharmacy Name and Location: [facility]  Reason for request: Going to [facility] on March 15th.  When did you use medication last?:  n/a  Okay to leave a detailed message: yes

## 2021-06-24 NOTE — TELEPHONE ENCOUNTER
Left message to call back for: Susanna  Information to relay to patient:      ----- Message from Catrachita Gauthier MD sent at 2/13/2019  3:47 PM CST -----  Please inform patient that the vaginal swab did not show any infection.

## 2021-06-24 NOTE — TELEPHONE ENCOUNTER
Call placed to patient's pharmacy they do have the scopolamine transdermal patch available. They only have qty 16 individual patches in stock.

## 2021-06-24 NOTE — TELEPHONE ENCOUNTER
Left message to call back for: Susanna-2nd attempt  Information to relay to patient:       ----- Message from Catrachita Gauthier MD sent at 2/13/2019  3:47 PM CST -----  Please inform patient that the vaginal swab did not show any infection.

## 2021-06-27 NOTE — PROGRESS NOTES
Progress Notes by Catrachita Christy MD at 1/21/2019  8:40 AM     Author: Catrachita Christy MD Service: -- Author Type: Physician    Filed: 1/21/2019 11:45 AM Encounter Date: 1/21/2019 Status: Signed    : Catrachita Christy MD (Physician)       FEMALE PREVENTATIVE EXAM    Assessment and Plan:     Routine general medical examination at a health care facility  We discussed healthy lifestyle, nutrition, cardiovascular risk reduction, self care, safety, sunscreen, seatbelt, and timing of cancer screening.  Health maintenance screening and immunizations reviewed with the patient.  -     Lipid Cascade  -     Glucose  -     Gynecologic Cytology (PAP Smear)    BMI 28.0-28.9,adult  Counseled healthy lifestyle modifications    Encounter for contraceptive management, unspecified type  Spoke about various forms of birth control from oral, transdermal, intravaginal, intramuscular, subdermal, intrauterine.  Also spoke about hormonal versus nonhormonal form of birth control.  Patient verbalized interest in the OCP.  Patient verbalized understanding and agree with plan       -     Pregnancy (Beta-hCG, Qual), Urine  -     norgestimate-ethinyl estradiol (ORTHO TRI-CYCLEN LO, 28,) 0.18/0.215/0.25 mg-25 mcg Tab tablet; Take 1 tablet by mouth daily.    Vaginal lesion  Suspect HSV  -     Wet Prep, Vaginal  -     Chlamydia trachomatis & Neisseria gonorrhoeae, Amplified Detection        -      HSV source    Next follow up:  One month    Immunization Review  Adult Imm Review: Unknown status, attempting to get records    I discussed the following with the patient:   Adult Healthy Living: Importance of regular exercise  Healthy nutrition  Getting adequate sleep  Stress management  Use of seat belts  Helmets  Contraception options  Sunscreen    Subjective:   Chief Complaint: Susanna Klein is an 23 y.o. female here for a preventative health visit.     HPI: Patient is wondering what kind of birth control  "would be best to help regulate her period and provide contraception. She was on birth control pills 4-5 years ago to regulate her period and she did not have any adverse side effects. Pt has irregular periods. She is sexually active. Her LNMP was 2 months ago. She is not on any medication, Rx or OTC.   Patient has an itchy, painful bump near her vagina present for the past 2 days. She has had outbreaks of these bumps in the past and notices she gets the bumps after having sex with her . Her  is her only sexual partner currently and she has never had sex with anyone else. She has not been using condoms.     Healthy Habits  Are you taking a daily aspirin? No  Do you typically exercising at least 40 min, 3-4 times per week?  NO  Do you usually eat at least 4 servings of fruit and vegetables a day, include whole grains and fiber and avoid regularly eating high fat foods? Yes  Have you had an eye exam in the past two years? Yes  Do you see a dentist twice per year? Yes  Do you have any concerns regarding sleep? No    Safety Screen  If you own firearms, are they secured in a locked gun cabinet or with trigger locks? The patient does not own any firearms  No Data Recorded    Review of Systems:  Please see above.  The rest of the review of systems are negative for all systems.     PAP History:  Cancer Screening       Status Date      PAP SMEAR Overdue 10/10/2015         Patient Care Team:  Catrachita Christy MD as PCP - General (Family Medicine)    History     Reviewed By Date/Time Sections Reviewed    Lucien An CMA 1/21/2019  8:56 AM Tobacco        Objective:   Vital Signs:   Visit Vitals  /66 (Patient Site: Left Arm, Patient Position: Sitting, Cuff Size: Adult Regular)   Pulse 64   Ht 5' 0.5\" (1.537 m)   Wt 149 lb (67.6 kg)   BMI 28.62 kg/m         PHYSICAL EXAM    Objective:    Physical Exam   Vitals:    01/21/19 0855   BP: 100/66   Pulse: 64      Constitutional: Patient is oriented " to person, place, and time. Patient appears well-developed and well-nourished. No distress.   Head: Normocephalic and atraumatic.   Right Ear: External ear normal. Normal TM  Left Ear: External ear normal. Normal TM  Nose: Nose normal.   Mouth/Throat: Oropharynx is clear and moist. No oropharyngeal exudate.   Eyes: Conjunctivae and EOM are normal. Pupils are equal, round, and reactive to light. Right eye exhibits no discharge. Left eye exhibits no discharge. No scleral icterus.   Neck: Neck supple. No JVD present. No tracheal deviation present. No thyromegaly present.   Cardiovascular: Normal rate, regular rhythm, normal heart sounds and intact distal pulses. No murmur heard.   Pulmonary/Chest: Effort normal and breath sounds normal. No stridor. No respiratory distress. Patient has no wheezes, no rales, exhibits no tenderness.   Abdominal: Soft. Bowel sounds are normal. Patient exhibits no distension and no mass. There is no tenderness. There is no rebound and no guarding.   Lymphadenopathy:  Patient has no cervical adenopathy.   Neurological: Patient is alert and oriented to person, place, and time. Patient has normal reflexes. No cranial nerve deficit. Coordination normal.   Skin: Skin is warm and dry. No rash noted. Patient is not diaphoretic. No erythema. No pallor.   Breast exam normal.  Pelvic exam: normal external genitalia, vulva, vagina, cervix, uterus and adnexa.  1 linear fissure and lesion at the left posterior androidal opening.         Medication List           Accurate as of 1/21/19 11:36 AM. If you have any questions, ask your nurse or doctor.               START taking these medications    norgestimate-ethinyl estradiol 0.18/0.215/0.25 mg-25 mcg Tab tablet  Also known as:  ORTHO TRI-CYCLEN LO (28)  INSTRUCTIONS:  Take 1 tablet by mouth daily.  Started by:  Catrachita Gauthier MD           STOP taking these medications    ibuprofen 200 MG tablet  Also known as:  ADVIL  Stopped by:  Catrachita  Lyly Gauthier MD     ranitidine 150 MG tablet  Also known as:  ZANTAC  Stopped by:  Catrachita Gauthier MD     valACYclovir 1000 MG tablet  Also known as:  VALTREX  Stopped by:  Catrachita Gauthier MD           Where to Get Your Medications      These medications were sent to [facility]/pharmacy #[id number] [place], [place] - [address]  [address] [address]    Phone:  [phone]     norgestimate-ethinyl estradiol 0.18/0.215/0.25 mg-25 mcg Tab tablet       Additional Screenings Completed Today:     ADDITIONAL HISTORY SUMMARIZED (2): None.   DECISION TO OBTAIN EXTRA INFORMATION (1): None.   RADIOLOGY TESTS (1): None.  LABS (1): Labs ordered today.  MEDICINE TESTS (1): None.  INDEPENDENT REVIEW (2 each): None.     Total data points = 1    Total time was 38 minutes additional to the preventive, greater than 50% counseling and coordinating care regarding annual physical and birth control consultation.    By signing my name below, I, Yasmine Redman, attest that this documentation has been prepared under the direction and in the presence of Dr. Lyly Shields.  Electronic Signature: Renetta Siddiqui. 01/21/2019 9:11.    I, Dr. Catrachita Gauthier MD , personally performed the services described in this documentation. All medical record entries made by the scribe were at my direction and in my presence. I have reviewed the chart and discharge instructions (if applicable) and agree that the record reflects my personal performance and is accurate and complete.

## 2021-06-28 NOTE — PROGRESS NOTES
Progress Notes by Catrachita Christy MD at 1/22/2020  8:00 AM     Author: Catrachita Christy MD Service: -- Author Type: Physician    Filed: 1/22/2020  9:21 AM Encounter Date: 1/22/2020 Status: Signed    : Catrachita Christy MD (Physician)       FEMALE PREVENTATIVE EXAM    Assessment and Plan:     Routine general medical examination at a health care facility  -     Lipid Cascade  -     Thyroid [place]  We discussed healthy lifestyle, nutrition, cardiovascular risk reduction, self care, safety, sunscreen, seatbelt, and timing of cancer screening.  Health maintenance screening and immunizations reviewed with the patient.  Come back in 1 month for nurse only visit for Hep A#2 and influenza vaccines    BMI 29.0-29.9,adult  Counseled healthy lifestyle modifications    Cough, productive x 3 months  With associated fatigue, feverish feeling, dizziness  Negative rapid strep and influenza  CXR was negative  Will give azithromycin for bronchitis  F/u in 2 wks if not better  -     Comprehensive Metabolic Panel  -     HM1(CBC and Differential)  -     HIV Antigen/Antibody Screening Cascade  -     Mononucleosis Screen  -     Rapid Strep A Screen-Throat  -     Influenza A/B Rapid Test  -     QTF-Mycobacterium tuberculosis by QuantiFERON-TB Gold Plus  -     HM1 (CBC with Diff)  -     XR Chest 2 Views; Future  -     Group A Strep, RNA Direct Detection, Throat  -     azithromycin (ZITHROMAX Z-XIN) 250 MG tablet; Take 2 tablets (500 mg) on  Day 1,  followed by 1 tablet (250 mg) once daily on Days 2 through 5.    Gastroesophageal reflux disease, esophagitis presence not specified  Prn TUMS    Next follow up:  Return for if symptoms worsen or fail to improve.    Immunization Review  Adult Imm Review: No immunizations due today  BMI: 29.44 kg/m^2    I discussed the following with the patient:   Adult Healthy Living: Importance of regular exercise  Healthy nutrition  Getting adequate sleep  Stress  management  Use of seat belts  Supplement use    Subjective:   Chief Complaint: Susanna Klein is an 24 y.o. female here for a preventative health visit.     HPI:    She will get exercise intermittently. She is not currently taking any medication including her birth control because she is trying to have a child.     Chills/Cough: For the past three months she has been feeling sick on and off. She has felt dizzy and feverish. She has been congested and coughing with a sore throat . She will be fatigued as well. She denies any coughing up blood or night sweats. Whenever she feels feverish she will also vomit.  The dizziness is worsened by exertion. She does not smoke or drink alcohol. She denies any tuberculosis exposure. She notes that she will occasionally get reflux into her chest. She has an as needed medication. She denies any urinary symptoms. Her last menstrual cycle was mid-December. However, her cycles have been irregular.      Health Maintenance: She is fasting today. She had a negative pap smear in January 2019. She is due for an influenza vaccination today.     Review of Systems: Please see above. The rest of the review of systems are negative for all systems.     Healthy Habits  Are you taking a daily aspirin? No  Do you typically exercising at least 40 min, 3-4 times per week?  Sometimes feels dizzy   Do you usually eat at least 4 servings of fruit and vegetables a day, include whole grains and fiber and avoid regularly eating high fat foods? Yes  Have you had an eye exam in the past two years? Yes  Do you see a dentist twice per year? 1 time a year   Do you have any concerns regarding sleep? YES    Safety Screen  If you own firearms, are they secured in a locked gun cabinet or with trigger locks? NO  Do you feel you are safe where you are living?: Yes (1/22/2020  8:05 AM)  Do you feel you are safe in your relationship(s)?: Yes (1/22/2020  8:05 AM)    Pap History:   No - age 21-29 PAP every 3 years  "recommended  Cancer Screening       Status Date      PAP SMEAR Next Due 1/21/2022      Done 1/21/2019 GYNECOLOGIC CYTOLOGY (PAP SMEAR)          Patient Care Team:  Catrachita Christy MD as PCP - General (Family Medicine)  Catrachita Christy MD as Assigned PCP        History     Reviewed By Date/Time Sections Reviewed    MoyYadira keller CMA 1/22/2020  8:09 AM Tobacco    Yadira Olivarez CMA 1/22/2020  8:06 AM Tobacco            Objective:   Vital Signs:   Visit Vitals  /66 (Patient Site: Left Arm, Patient Position: Sitting, Cuff Size: Adult Regular)   Pulse 74   Temp 97.4  F (36.3  C)   Ht 5' 0.25\" (1.53 m)   Wt 152 lb (68.9 kg)   LMP 12/30/2019 (Approximate)   SpO2 97%   BMI 29.44 kg/m       PHYSICAL EXAM  Constitutional: Patient is oriented to person, place, and time. Patient appears well-developed and well-nourished. No distress.   Head: Normocephalic and atraumatic.   Right Ear: External ear normal.   Left Ear: External ear normal.   Nose: Nose normal.   Mouth/Throat: Oropharynx is clear and moist. No oropharyngeal exudate.   Eyes: Conjunctivae and EOM are normal. Pupils are equal, round, and reactive to light. Right eye exhibits no discharge. Left eye exhibits no discharge. No scleral icterus.   Neck: Neck supple. No JVD present. No tracheal deviation present. No thyromegaly present.   Breasts:  Normal appearing, no skin involvement, no palpable mass, no tenderness on palpation.  No axillary involvement  Cardiovascular: Normal rate, regular rhythm, normal heart sounds and intact distal pulses.   No murmur heard.   Pulmonary/Chest: Effort normal and breath sounds normal. No stridor. No respiratory distress. Patient has no wheezes, no rales, exhibits no tenderness.   Abdominal: Soft. Bowel sounds are normal. Patient exhibits no distension and no mass. There is generalized discomfort to palpation of the abdomen. There is no rebound and no guarding.   Lymphadenopathy:  Patient has no " cervical adenopathy.   Neurological: Patient is alert and oriented to person, place, and time. Patient has normal reflexes. No cranial nerve deficit. Coordination normal.   Skin: Skin is warm and dry. No rash noted. Patient is not diaphoretic. No erythema. No pallor.   Psychiatric: Patient has good eye contact without any psychomotor retardation or stereotypic behaviors.  normal mood and affect. Judgment and thought content normal.   Speech is regular rate and rhythm.     Xr Chest 2 Views    Result Date: 1/22/2020  EXAM: XR CHEST 2 VIEWS LOCATION: [facility] DATE/TIME: 1/22/2020 9:02 AM INDICATION: Cough COMPARISON: None.     Shallow inspiration. Lungs are clear. No pleural effusion. Heart size and pulmonary vascularity within normal limits.      Results for orders placed or performed in visit on 01/22/20   Rapid Strep A Screen-Throat   Result Value Ref Range    Rapid Strep A Antigen No Group A Strep detected, presumptive negative No Group A Strep detected, presumptive negative   Influenza A/B Rapid Test   Result Value Ref Range    Influenza  A, Rapid Antigen No Influenza A antigen detected No Influenza A antigen detected    Influenza B, Rapid Antigen No Influenza B antigen detected No Influenza B antigen detected   Mononucleosis Screen   Result Value Ref Range    Mono Screen Negative Negative     QUALITY MEASURES:  The following high BMI interventions were performed this visit: encouragement to exercise and dietary needs education    ADDITIONAL HISTORY SUMMARIZED (2): None.  DECISION TO OBTAIN EXTRA INFORMATION (1): None.   RADIOLOGY TESTS (1): Ordered XR Chest today.   LABS (1): Reviewed labs from 05/01/19 and ordered labs today.   MEDICINE TESTS (1): Performed Rapid Strep Test and influenza a/b test today.   INDEPENDENT REVIEW (2 each): interpreted CXR.     Jayla CELESTE, am scribing for and in the presence of, Dr. Shields.    Dr. Alyson CELESTE, personally performed the services described in this  documentation, as scribed by Jayla Recio in my presence, and it is both accurate and complete.    Total data points: 5       Medication List          Accurate as of January 22, 2020  9:18 AM. If you have any questions, ask your nurse or doctor.            CHANGE how you take these medications    azithromycin 250 MG tablet  Also known as:  Zithromax Z-Abdoul  INSTRUCTIONS:  Take 2 tablets (500 mg) on  Day 1,  followed by 1 tablet (250 mg) once daily on Days 2 through 5.  Stop taking on:  January 27, 2020  What changed:      medication strength    additional instructions  Changed by:  Catrachita Gauthier MD           STOP taking these medications    atovaquone-proguaniL 250-100 mg Tab  Also known as:  MALARONE  Stopped by:  Catrachita Gauthier MD     loperamide 2 mg tablet  Also known as:  IMODIUM A-D  Stopped by:  Catrachita Gauthier MD     norgestimate-ethinyl estradiol 0.18/0.215/0.25 mg-25 mcg tablet  Also known as:  Ortho Tri-Cyclen LO (28)  Stopped by:  Catrachita Gauthier MD     promethazine 12.5 MG tablet  Also known as:  PHENERGAN  Stopped by:  Catrachita Gauthier MD           Where to Get Your Medications      These medications were sent to [facility]/pharmacy #[id number] - [place], [place] - [address]  [address] [address]    Phone:  [phone]     azithromycin 250 MG tablet         Additional Screenings Completed Today:

## 2021-07-03 NOTE — ADDENDUM NOTE
Addendum Note by Catrachita Mccarthy MD at 2/7/2019  2:20 PM     Author: Catrachita Mccarthy MD Service: -- Author Type: Physician    Filed: 2/21/2019  7:48 AM Encounter Date: 2/7/2019 Status: Signed    : Catrachita Mccarthy MD (Physician)    Addended by: CATRACHITA MCCARTHY on: 2/21/2019 07:48 AM        Modules accepted: Orders

## 2022-04-01 NOTE — TELEPHONE ENCOUNTER
[facility] called are are unable to run HSV 1 & 2 by PCR because it was collected in the wrong swab.  It needs to be collected in red capped [place] swab. They cancelled the test and reordered as a future.  Please have your assistant call patient to come back to recollect if test is still needed.  Thanks  
Left message to call back for: Susanna  Information to relay to patient:      ----- Message from Catrachita Gauthier MD sent at 1/23/2019 11:45 AM CST -----  Patient does not speak English.  Please inform the patient that she has chlamydia. I advise treatment to be started as soon as possible. A prescription for azithromycin (1 gram as a single oral dose) was sent to her pharmacy. Her partner also needs to be treated as soon as possible and can do so by scheduling an appointment at any office.  No sexual activity while being treated, for at least for next one week. I would like to see her in the clinic.    
Patient notified using  services.  Earlene Cuevas [facility]    
complains of pain/discomfort

## 2023-03-21 NOTE — NURSING NOTE
"Chief Complaint   Patient presents with     Exposure to TB     Cousin recently diagnosed with Tuberculosis - not sure if its active       Initial /72 (BP Location: Right arm, Cuff Size: Adult Regular)  Pulse 80  Temp 98.4  F (36.9  C) (Oral)  Ht 5' 0.5\" (1.537 m)  Wt 137 lb (62.1 kg)  SpO2 99%  Breastfeeding? No  BMI 26.32 kg/m2 Estimated body mass index is 26.32 kg/(m^2) as calculated from the following:    Height as of this encounter: 5' 0.5\" (1.537 m).    Weight as of this encounter: 137 lb (62.1 kg).  Medication Reconciliation: complete    " none